# Patient Record
Sex: MALE | Race: BLACK OR AFRICAN AMERICAN | Employment: UNEMPLOYED | URBAN - METROPOLITAN AREA
[De-identification: names, ages, dates, MRNs, and addresses within clinical notes are randomized per-mention and may not be internally consistent; named-entity substitution may affect disease eponyms.]

---

## 2019-10-16 ENCOUNTER — HOSPITAL ENCOUNTER (OUTPATIENT)
Dept: PHYSICAL THERAPY | Age: 69
Discharge: HOME | End: 2019-10-16
Payer: MEDICARE

## 2019-10-16 PROCEDURE — 97161 PT EVAL LOW COMPLEX 20 MIN: CPT

## 2019-10-16 PROCEDURE — 97110 THERAPEUTIC EXERCISES: CPT

## 2019-10-16 NOTE — PROGRESS NOTES
[facility]  [address], [address] [facility]    OUTPATIENT PHYSICAL THERAPY    INITIAL EVALUATION      NAME: Steff Alcala AGE: 81 y.o. GENDER: male  DATE: 10/16/2019  REFERRING PHYSICIAN: Honorio Nowak MD    OBJECTIVE DATA SUMMARY:   Medical Diagnosis: left knee pain (M25.552) right knee pain (M25.561)  PT Diagnosis: pain affecting function, decreased ROM  Date of Onset: recent onset of stiffness and pain, B/L TKR surgery Left 6/2010, right 3/2011  Mechanism of Injury/Chief Complaint:  Gradual onset of stiffness and pain increased last 3 to 4 years  Present Symptoms: pain and stiffness in left knee, pain in right knee but it feels less stiff. Pt has been trying to walk more d/t a diagnosis of Type 2 diabetes, trying to lose weight. Functional Deficits and Limitations:   [x]     Sitting:   []    Dressing:   []    Reaching:  [x]     Standing:   []     Bathing:   []    Lifting:  [x]     Walking:   []     Cooking:   []    Yardwork:  [x]     Sleeping:   []     Cleaning:   []     Driving:  []     Work Tasks:  []     Recreation:  []    Other:    HISTORY:  Past Medical History:   Past Medical History:   Diagnosis Date    Arthritis     Diabetes ([address]     Heart murmur 3/18/2011    Hypertension     Other ill-defined conditions     HYPERLIPEDEMIA    PVC (premature ventricular contraction) 3/18/2011    Sinus bradycardia 3/18/2011     Past Surgical History:   Procedure Laterality Date    HX ORTHOPAEDIC  6/4/2010    RIGHT TKR    HX UROLOGICAL  1994    TESTICLE REMOVED       Precautions: none  Current Medications:  Tylenol  Prior Level of Function/Home Situation: Independent  Personal factors and/or comorbidities impacting plan of care:Pt diagnosed with diabetes in 2013,  Social/Work History:  Not working at this time  Previous Therapy:  Yes post surgery    SUBJECTIVE:   Pt reports pain and stiffness in both knees. TKR left 6/2010 right 3/2011.   Patients goals for therapy: decreased pain and increase exercise and walking distance    OBJECTIVE DATA SUMMARY:   EXAMINATION/PRESENTATION/DECISION MAKING:   Pain:  Location: top of patella B/L  Quality: aching  Now: 7/10  Best:  Worst:8/10  Factors that improve pain: rest    Posture:   Standing posture WNL    Strength:   B/L Knee flexion and knee extenstion 5/5    Range of Motion:   Left knee  Flexion 70 degrees  Extension -5 degrees    Right knee   Flexion 65 degrees  Extension -5 degrees    Spinal Assessment:   WNL    Joint Mobility:   Patella mobility  B/L Med- Lateral mild hypomobility  B/L Sup-inf, painful and hypomobile    Palpation:   TTP superior border of patella  b/L    Neurologic Assessment:   Tone: normal   Sensation: WNL   Reflexes: not tested    Special Tests:   None this date    Mobility:   Transitional Movements: WFL    Gait: WFL    Balance:  Normal    Functional Measure:   LEFS 48/80     Physical Therapy Evaluation Charge Determination   History Examination Presentation Decision-Making   LOW Complexity : Zero comorbidities / personal factors that will impact the outcome / POC LOW Complexity : 1-2 Standardized tests and measures addressing body structure, function, activity limitation and / or participation in recreation  LOW Complexity : Stable, uncomplicated  LOW Complexity : FOTO score of       Based on the above components, the patient evaluation is determined to be of the following complexity level: LOW     TREATMENT/INTERVENTION:  Modalities (Rationale): MHP b/L prior to exercises to decrease pain and increase flexibility      Therapeutic Exercises: to develop strength, endurance, range of motion, and flexibility  HEP Wall slide, hip flexor stretch, bridges, adductor stretch, knee extension stretch (support under heel), KTC, prone hip extension, prone knee flexion AROM, seated knee stretch, squats    Clinic activities    Supine  Hip flexor stretch, 3 reps x 1minute B  Bridges x 10 reps  Adductor stretch 3 reps 30 sec hold B  Knee extension stretch, with heel support 3 reps 30 sec hold B  KTC      Prone  Hip extension x 10 reps B  Knee flexion x 5 reps 20 sec hold B      Seated  Knee stretch with alt LE assist    Standing   Mini squats x 10 reps    Manual Therapy: for joint mobilization/manipulations and soft tissue mobilization  None mthis date    Neuro Re-Education: to improve movement, balance, coordination, kinesthetic sense, posture, and proprioception for sitting or standing balance  None this date    Therapeutic Activities: to improve functional performance, power, or agility  none    Ambulation/Gait Training:  None this date    Activity tolerance and post treatment pain report:   Good tolerance  Education:  [x]     Home exercise program provided. Education was provided to the patient on the following topics: importance of daily exercises to regain knee ROM. Patient verbalized understanding of the topics presented. ASSESSMENT:   Kirill Hernandez is a 81 y.o. male who presents with pain and decreased ROM in B/L knees. Pt reports TKR left 2010, right 2011. Good result but over last year or so, his pain has increased and he reports that his knees feel stiff. He has bee trying to increase his walking distance to lose weight. He was diagnosed with type 2 diabetes and has lost about 25#. Baldo Gonsalves Physical therapy problems based on objective data include: pain affecting function and decrease ROM . Patient will benefit from skilled intervention to address these impairments. Rehabilitation potential is considered to be Good. Factors which may influence rehabilitation potential include length of time since TKR surgery . Patient will benefit from physical therapy visits 2 times per week over 8 weeks to optimize improvement in these areas.     PLAN OF CARE:   Recommendations and Planned Interventions:  []     Therapeutic Activities  []     Heat/Ice  []     Therapeutic Exercises  []     Ultrasound  []     Gait training  [] E-stim  []     Balance training  []     Home exercise program  []     Manual Therapy  []     TENS  []     Neuro Re-Ed  []     Edema management  []     Posture/Biomechanics  []     Pain management  []     Traction  []     Other:    Frequency/Duration:  Patient will be followed by physical therapy 2 times a week for  8 weeks to address goals. GOALS  Short term goals  Time frame: 4 weeks  1. Patient will be compliant and independent with the initial HEP as evidenced by being able to perform without cuing. 2. Patient will report a 50% improvement in symptoms. 3. Patient report a 50% improvement in sleeping. 4. Patient will have a 20degree increase in B/L knee  ROM. Marsha Chess 5. Patient will tolerate 15 minutes of clinic activities before onset of symptoms. Long term goals  Time frame: 8 weeks  1. Patient will report pain level decrease to 3/10 to allow increased ease of movement. 2. Patient will have an improved score on the LEFS outcome measure by 20 points to demonstrate an increase in functional activity tolerance. 3. Patient will be independent in final individualized HEP. 4. Patient will have an increase in knee ROM to 100  degrees  5. Patient will return to walking without being limited by symptoms. 6. Patient will sleep 6-8 hours without being interrupted by pain. [x]     Patient has participated in goal setting and agrees to work toward plan of care. [x]     Patient was instructed to call if questions or concerns arise. Thank you for this referral.  Tj Noriega, PT   Time Calculation: 60 mins    Patient Time in clinic:   Start Time: 0800   Stop Time: 0900    TREATMENT PLAN EFFECTIVE DATES:   10/16/2019 TO 12/16/2019  I have read the above plan of care for Nimesh Crouch and certify the need for skilled physical therapy services.     Physician Signature: ____________________________________________________    Date: _________________________________________________________________

## 2019-10-21 ENCOUNTER — HOSPITAL ENCOUNTER (OUTPATIENT)
Dept: PHYSICAL THERAPY | Age: 69
Discharge: HOME | End: 2019-10-21
Payer: MEDICARE

## 2019-10-21 PROCEDURE — 97110 THERAPEUTIC EXERCISES: CPT | Performed by: PHYSICAL THERAPIST

## 2019-10-21 NOTE — PROGRESS NOTES
[facility]  [address], [address] [facility]    OUTPATIENT PHYSICAL THERAPY DAILY TREATMENT NOTE  VISIT: 2    NAME: Efe Rudd AGE: 81 y.o. GENDER: male  DATE: 10/21/2019  REFERRING PHYSICIAN: Vika Cowan MD        GOALS  Short term goals  Time frame: 4 weeks  1. Patient will be compliant and independent with the initial HEP as evidenced by being able to perform without cuing. 2. Patient will report a 50% improvement in symptoms. 3. Patient report a 50% improvement in sleeping. 4. Patient will have a 20degree increase in B/L knee  ROM. [place] Hind 5. Patient will tolerate 15 minutes of clinic activities before onset of symptoms.      Long term goals  Time frame: 8 weeks  1. Patient will report pain level decrease to 3/10 to allow increased ease of movement. 2. Patient will have an improved score on the LEFS outcome measure by 20 points to demonstrate an increase in functional activity tolerance. 3. Patient will be independent in final individualized HEP. 4. Patient will have an increase in knee ROM to 100  degrees  5. Patient will return to walking without being limited by symptoms. 6. Patient will sleep 6-8 hours without being interrupted by pain. SUBJECTIVE:   \"It feels a little looser. \"    Pain In: 6-7/10 bilateral knees    OBJECTIVE DATA SUMMARY:   Objective data from initial evaluation:   EXAMINATION/PRESENTATION/DECISION MAKING:   Pain:  Location: top of patella B/L  Quality: aching  Now: 7/10  Worst:8/10  Factors that improve pain: rest     Posture:   Standing posture WNL     Strength:   B/L Knee flexion and knee extenstion 5/5     Range of Motion:   Left knee  Flexion 70 degrees  Extension -5 degrees     Right knee   Flexion 65 degrees  Extension -5 degrees     Spinal Assessment:    WNL     Joint Mobility:   Patella mobility  B/L Med- Lateral mild hypomobility  B/L Sup-inf, painful and hypomobile     Palpation:   TTP superior border of patella b/L     Neurologic Assessment:               Tone: normal               Sensation: WNL               Reflexes: not tested     Special Tests:   None this date     Mobility:               Transitional Movements: WFL                Gait: WFL     Balance:  Normal     Functional Measure:   LEFS 48/80     TREATMENT/INTERVENTION:  Modalities (Rationale): MHP to bilateral knees for 10 minutes at end of session; to decrease pain and increase flexibility; no skin irritation noted        Therapeutic Exercises: to develop strength, endurance, range of motion, and flexibility  HEP Wall slide, hip flexor stretch, bridges, adductor stretch, knee extension stretch (support under heel), KTC, prone hip extension, prone knee flexion AROM, seated knee stretch, squats     Exercises in BOLD performed this date:      UBE/LBE: 5 minutes, level 1    Supine:   SLR: 2 sets of 5 reps B  Hip flexor stretch, 3 reps x 1minute B  Bridges x 10 reps  Adductor stretch 3 reps 30 sec hold B  Knee extension stretch, with heel support 3 reps 30 sec hold B  KTC     Prone  Hip extension: 2 sets of 5 reps B  Knee flexion stretch with sheet: 3 reps with 30 sec hold B     Seated  Knee stretch with alt LE assist: 2 reps with 30 sec holds B  Hamstring stretch: 2 reps with 30 sec holds B     Standing:   Mini squats: 2 sets of 10 reps  Knee flexion and extension stretch on step (with 3 risers each side): 5 reps each B     Manual Therapy: for joint mobilization/manipulations and soft tissue mobilization  None this date     Neuro Re-Education: to improve movement, balance, coordination, kinesthetic sense, posture, and proprioception for sitting or standing balance  None this date    Activity tolerance and post treatment pain report:   Good  Pain Out: 6/10    Education:  Education was provided to the patient on the following topics.   []    No changes were made to the home exercise program.  [x]    The following changes were made to the home exercise program: added seated hamstring stretch and prone knee flexion stretch with sheet for additional stretch  Patient verbalized understanding of the topics presented. ASSESSMENT:   Patient returns following initial evaluation. He presents with continued bilateral knee pain, but reports that his knees feel looser since starting the exercises. Patient reports regular performance of HEP. Patient tolerated clinic exercises well. Patient with significantly limited bilateral knee flexion ROM. Patients progression toward goals is as follows:  [x]     Improving appropriately and progressing toward goals  []     Improving slowly and progressing toward goals  []     Not making progress toward goals and plan of care will be adjusted    PLAN OF CARE:   Patient continues to benefit from skilled intervention to address the above impairments. [x]    Continue treatment per established plan of care.   []     Recommend the following changes to the plan of care    Recommendations/Intent for next treatment: Patellar mobs; side steps with TB    Haile Graf, PT   Time Calculation: 40 mins  Patient Time in clinic:   Start Time: 0800   Stop Time: 0840

## 2019-10-23 ENCOUNTER — HOSPITAL ENCOUNTER (OUTPATIENT)
Dept: PHYSICAL THERAPY | Age: 69
Discharge: HOME | End: 2019-10-23
Payer: MEDICARE

## 2019-10-23 PROCEDURE — 97110 THERAPEUTIC EXERCISES: CPT

## 2019-10-23 NOTE — PROGRESS NOTES
[address]    OUTPATIENT PHYSICAL THERAPY DAILY TREATMENT NOTE  VISIT: 3    NAME: Dimitri Seen AGE: 81 y.o. GENDER: male  DATE: 10/23/2019  REFERRING PHYSICIAN: Johny Hammans, MD        GOALS  Short term goals  Time frame: 4 weeks  1. Patient will be compliant and independent with the initial HEP as evidenced by being able to perform without cuing. 2. Patient will report a 50% improvement in symptoms. 3. Patient report a 50% improvement in sleeping. 4. Patient will have a 20degree increase in B/L knee  ROM. Sarina Pass 5. Patient will tolerate 15 minutes of clinic activities before onset of symptoms.      Long term goals  Time frame: 8 weeks  1. Patient will report pain level decrease to 3/10 to allow increased ease of movement. 2. Patient will have an improved score on the LEFS outcome measure by 20 points to demonstrate an increase in functional activity tolerance. 3. Patient will be independent in final individualized HEP. 4. Patient will have an increase in knee ROM to 100  degrees  5. Patient will return to walking without being limited by symptoms. 6. Patient will sleep 6-8 hours without being interrupted by pain. SUBJECTIVE:   \"It feels a little looser. \"    Pain In: 6-7/10 bilateral knees    OBJECTIVE DATA SUMMARY:   Objective data from initial evaluation:   EXAMINATION/PRESENTATION/DECISION MAKING:   Pain:  Location: top of patella B/L  Quality: aching  Now: 7/10  Worst:8/10  Factors that improve pain: rest     Posture:   Standing posture WNL     Strength:   B/L Knee flexion and knee extenstion 5/5     Range of Motion:   Left knee  Flexion 70 degrees  Extension -5 degrees     Right knee   Flexion 65 degrees  Extension -5 degrees     Spinal Assessment:    WNL     Joint Mobility:   Patella mobility  B/L Med- Lateral mild hypomobility  B/L Sup-inf, painful and hypomobile     Palpation:   TTP superior border of patella b/L     Neurologic Assessment:               Tone: normal               Sensation: WNL               Reflexes: not tested     Special Tests:   None this date     Mobility:               Transitional Movements: WFL                Gait: WFL     Balance:  Normal     Functional Measure:   LEFS 48/80     TREATMENT/INTERVENTION:  Modalities (Rationale): MHP to bilateral knees for 10 minutes at end of session; to decrease pain and increase flexibility; no skin irritation noted        Therapeutic Exercises: to develop strength, endurance, range of motion, and flexibility  HEP Wall slide, hip flexor stretch, bridges, adductor stretch, knee extension stretch (support under heel), KTC, prone hip extension, prone knee flexion AROM, seated knee stretch, squats     Exercises in BOLD performed this date:      UBE/LBE: 5 minutes, level 3    Supine:   SLR: 2 sets of 10 reps B  Bridges x 10 reps  Adductor stretch 3 reps 30 sec hold B  KTC 5 reps B, pt gripping behind knee     Prone  Hip extension: 2 sets of 5 reps B  Knee flexion stretch with sheet: 3 reps with 30 sec hold B     Seated  LAQ B x 10 reps  Hamstring stretch: 2 reps with 30 sec holds B     Standing:   Mini squats: 2 sets of 10 reps  Knee flexion and extension stretch on step (with 3 risers each side): 5 reps each B  Hip ext and abduction B red TB 10 reps     Manual Therapy: for joint mobilization/manipulations and soft tissue mobilization  None this date     Neuro Re-Education: to improve movement, balance, coordination, kinesthetic sense, posture, and proprioception for sitting or standing balance  None this date    Activity tolerance and post treatment pain report:   Good  Pain Out: 6/10    Education:  Education was provided to the patient on the following topics.   []    No changes were made to the home exercise program.  [x]    The following changes were made to the home exercise program: added seated hamstring stretch and prone knee flexion stretch with sheet for additional stretch  Patient verbalized understanding of the topics presented. ASSESSMENT:    He presents with continued bilateral knee pain and left knee swelling, but reports that knees feel looser since evaluation. . Patient reports regular performance of HEP, good recall of exercises and effort during Therapy session. .dvance as able, patient with significantly limited bilateral knee flexion ROM. Patients progression toward goals is as follows:  [x]     Improving appropriately and progressing toward goals  []     Improving slowly and progressing toward goals  []     Not making progress toward goals and plan of care will be adjusted    PLAN OF CARE:   Patient continues to benefit from skilled intervention to address the above impairments. [x]    Continue treatment per established plan of care.   []     Recommend the following changes to the plan of care    Recommendations/Intent for next treatment: Patellar mobs; side steps with TB    Mariajose Hernandez, PT   Time Calculation: 40 mins  Patient Time in clinic:   Start Time: 0800   Stop Time: 7733

## 2019-10-28 ENCOUNTER — HOSPITAL ENCOUNTER (OUTPATIENT)
Dept: PHYSICAL THERAPY | Age: 69
Discharge: HOME | End: 2019-10-28
Payer: MEDICARE

## 2019-10-28 PROCEDURE — 97110 THERAPEUTIC EXERCISES: CPT | Performed by: PHYSICAL THERAPIST

## 2019-10-28 NOTE — PROGRESS NOTES
[facility]  [address], [address] [facility]    OUTPATIENT PHYSICAL THERAPY DAILY TREATMENT NOTE  VISIT: 4    NAME: Bassam Noble AGE: 81 y.o. GENDER: male  DATE: 10/28/2019  REFERRING PHYSICIAN: Colton Dowling MD      GOALS  Short term goals  Time frame: 4 weeks  1. Patient will be compliant and independent with the initial HEP as evidenced by being able to perform without cuing. 2. Patient will report a 50% improvement in symptoms. 3. Patient report a 50% improvement in sleeping. 4. Patient will have a 20degree increase in B/L knee  ROM. Daphne Geiger 5. Patient will tolerate 15 minutes of clinic activities before onset of symptoms.      Long term goals  Time frame: 8 weeks  1. Patient will report pain level decrease to 3/10 to allow increased ease of movement. 2. Patient will have an improved score on the LEFS outcome measure by 20 points to demonstrate an increase in functional activity tolerance. 3. Patient will be independent in final individualized HEP. 4. Patient will have an increase in knee ROM to 100  degrees  5. Patient will return to walking without being limited by symptoms. 6. Patient will sleep 6-8 hours without being interrupted by pain. SUBJECTIVE:   \"The right knee feels weak, but the left knee feels stiff\"    Pain In: 5-6/10 bilateral knees    OBJECTIVE DATA SUMMARY:   Objective data from initial evaluation:   EXAMINATION/PRESENTATION/DECISION MAKING:   Pain:  Location: top of patella B/L  Quality: aching  Now: 7/10  Worst:8/10  Factors that improve pain: rest     Posture:   Standing posture WNL     Strength:   B/L Knee flexion and knee extenstion 5/5     Range of Motion:   Left knee  Flexion 70 degrees  Extension -5 degrees     Right knee   Flexion 65 degrees  Extension -5 degrees     Spinal Assessment:    WNL     Joint Mobility:   Patella mobility  B/L Med- Lateral mild hypomobility  B/L Sup-inf, painful and hypomobile     Palpation:   TTP superior border of patella  b/L     Neurologic Assessment:               Tone: normal               Sensation: WNL               Reflexes: not tested     Special Tests:   None this date     Mobility:               Transitional Movements: WFL                Gait: WFL     Balance:  Normal     Functional Measure:   LEFS 48/80     TREATMENT/INTERVENTION:  Modalities (Rationale): MHP to bilateral knees for 10 minutes at end of session; to decrease pain and increase flexibility; no skin irritation noted        Therapeutic Exercises: to develop strength, endurance, range of motion, and flexibility  HEP Wall slide, hip flexor stretch, bridges, adductor stretch, knee extension stretch (support under heel), KTC, prone hip extension, prone knee flexion AROM, seated knee stretch, squats     Exercises in BOLD performed this date:      UBE/LBE: 8 minutes, level 3.5    Supine:   SLR with 2#: 2 sets of 10 reps B  Bridges x 10 reps  Adductor stretch 3 reps 30 sec hold B  KTC 5 reps B, pt gripping behind knee     Prone  Hip extension: 10 reps B  Knee flexion stretch with sheet: 3 reps with 30 sec hold B     Seated  LAQ B x 10 reps  Hamstring stretch: 2 reps with 30 sec holds B     Standing:   Mini squats with blue TB: 2 sets of 10 reps  Knee flexion and extension stretch on step (with 3 risers each side): 5 reps each B  Hip extension with green TB: 10 reps B  Hip abduction with green TB: 10 reps B  Step ups (2 risers each side): 10 reps B  Side steps with blue TB: 15 feet x 2 each direction     Manual Therapy: for joint mobilization/manipulations and soft tissue mobilization  None this date     Neuro Re-Education: to improve movement, balance, coordination, kinesthetic sense, posture, and proprioception for sitting or standing balance  None this date    Activity tolerance and post treatment pain report:   Good  Pain Out: 5/10    Education:  Education was provided to the patient on the following topics. continue HEP  [x]    No changes were made to the home exercise program.  []    The following changes were made to the home exercise program  Patient verbalized understanding of the topics presented. ASSESSMENT:   He presents with continued stiffness and pain in bilateral knees. He reports that the right knee feels weaker than the left. Patient reports regular performance of HEP. Patient with significantly limited bilateral knee flexion ROM, but reports that his knees do feel looser than on evaluation. Patient tolerated additional exercises well with good participation noted. Patients progression toward goals is as follows:  [x]     Improving appropriately and progressing toward goals  []     Improving slowly and progressing toward goals  []     Not making progress toward goals and plan of care will be adjusted    PLAN OF CARE:   Patient continues to benefit from skilled intervention to address the above impairments. [x]    Continue treatment per established plan of care.   []     Recommend the following changes to the plan of care    Recommendations/Intent for next treatment: Patellar dylon Bell PT   Time Calculation: 40 mins  Patient Time in clinic:   Start Time: 0930   Stop Time: 1010

## 2019-11-01 ENCOUNTER — HOSPITAL ENCOUNTER (OUTPATIENT)
Dept: PHYSICAL THERAPY | Age: 69
Discharge: HOME | End: 2019-11-01
Payer: MEDICARE

## 2019-11-01 PROCEDURE — 97110 THERAPEUTIC EXERCISES: CPT | Performed by: PHYSICAL THERAPIST

## 2019-11-01 PROCEDURE — 97140 MANUAL THERAPY 1/> REGIONS: CPT | Performed by: PHYSICAL THERAPIST

## 2019-11-01 NOTE — PROGRESS NOTES
[facility]  [address], [address] [facility]    OUTPATIENT PHYSICAL THERAPY DAILY TREATMENT NOTE  VISIT: 5    NAME: Rigoberto Traylor AGE: 81 y.o. GENDER: male  DATE: 11/1/2019  REFERRING PHYSICIAN: Osbaldo Padilla MD      GOALS  Short term goals  Time frame: 4 weeks  1. Patient will be compliant and independent with the initial HEP as evidenced by being able to perform without cuing. 2. Patient will report a 50% improvement in symptoms. 3. Patient report a 50% improvement in sleeping. 4. Patient will have a 20degree increase in B/L knee  ROM. Dagmar Mays 5. Patient will tolerate 15 minutes of clinic activities before onset of symptoms.      Long term goals  Time frame: 8 weeks  1. Patient will report pain level decrease to 3/10 to allow increased ease of movement. 2. Patient will have an improved score on the LEFS outcome measure by 20 points to demonstrate an increase in functional activity tolerance. 3. Patient will be independent in final individualized HEP. 4. Patient will have an increase in knee ROM to 100  degrees  5. Patient will return to walking without being limited by symptoms. 6. Patient will sleep 6-8 hours without being interrupted by pain. SUBJECTIVE:   \"My knees feel looser. The right one will occasionally get a sharp pain on the inside when I turn quickly or get up wrong. \"    Pain In: 3-4/10 bilateral knees    OBJECTIVE DATA SUMMARY:   Objective data from initial evaluation:   EXAMINATION/PRESENTATION/DECISION MAKING:   Pain:  Location: top of patella B/L  Quality: aching  Now: 7/10  Worst:8/10  Factors that improve pain: rest     Posture:   Standing posture WNL     Strength:   B/L Knee flexion and knee extension 5/5     Range of Motion:   Left knee AAROM in supine  Flexion 70 degrees (Updated 11/1/19: 88 degrees)  Extension -5 degrees (Updated 11/1/19: 0 degrees)     Right knee AAROM in supine:   Flexion 65 degrees (Updated 11/1/19: 92 degrees)  Extension -5 degrees (Updated 11/1/19: 0 degrees)     Spinal Assessment:    WNL     Joint Mobility:   Patella mobility  B/L Med- Lateral mild hypomobility  B/L Sup-inf, painful and hypomobile     Palpation:   TTP superior border of patella  b/L     Neurologic Assessment:               Tone: normal               Sensation: WNL               Reflexes: not tested     Special Tests:   None this date     Mobility:               Transitional Movements: WFL                Gait: WFL     Balance:  Normal     Functional Measure:   LEFS 48/80     TREATMENT/INTERVENTION:  Modalities (Rationale): MHP to bilateral knees for 10 minutes at end of session; to decrease pain and increase flexibility; no skin irritation noted held this date        Therapeutic Exercises: to develop strength, endurance, range of motion, and flexibility  HEP Wall slide, hip flexor stretch, bridges, adductor stretch, knee extension stretch (support under heel), KTC, prone hip extension, prone knee flexion AROM, seated knee stretch, squats     Exercises in BOLD performed this date:      UBE/LBE: 5 minutes, level 4    Supine:   SLR with 2#: 2 sets of 10 reps B  Bridges x 10 reps  Adductor stretch 3 reps 30 sec hold B  KTC 5 reps B, pt gripping behind knee     Prone  Hip extension: 10 reps B  Knee flexion stretch with sheet: 3 reps with 30 sec hold B     Seated:  LAQ B x 10 reps  Hamstring stretch: 2 reps with 30 sec holds B     Standing:   Mini squats with blue TB: 2 sets of 10 reps  Knee flexion and extension stretch on step (with 3 risers each side): 5 reps each B  Hip extension with green TB: 10 reps B  Hip abduction with green TB: 10 reps B  Step ups (2 risers each side): 10 reps B  Side steps with blue TB: 15 feet x 2 each direction     Manual Therapy: for joint mobilization/manipulations and soft tissue mobilization  B Knee flexion stretch in prone   Patellar mobs medial-lateral, superior-inferior; grade 2, 3  STM to distal hamstrings B     Neuro Re-Education: to improve movement, balance, coordination, kinesthetic sense, posture, and proprioception for sitting or standing balance  None this date    Activity tolerance and post treatment pain report:   Good  Pain Out: 3/10    Education:  Education was provided to the patient on the following topics. continue HEP  [x]    No changes were made to the home exercise program.  []    The following changes were made to the home exercise program  Patient verbalized understanding of the topics presented. ASSESSMENT:   Patient presents with overall decrease in bilateral knee pain since evaluation. Significant improvement in bilateral knee ROM noted. Patient continues to experience stiffness in bilateral knees, but reports that his knees feel looser. Patient occasionally experiences a sharp pain at his medial right knee if he turns quickly or gets up with his knee twisted the wrong way. Will continue to monitor. He reports that he does typically favor his right knee when walking. Patient reports regular performance of HEP. Patients progression toward goals is as follows:  [x]     Improving appropriately and progressing toward goals  []     Improving slowly and progressing toward goals  []     Not making progress toward goals and plan of care will be adjusted    PLAN OF CARE:   Patient continues to benefit from skilled intervention to address the above impairments. [x]    Continue treatment per established plan of care.   []     Recommend the following changes to the plan of care    Recommendations/Intent for next treatment: progress as able    Brittnee Amezquita, PT   Time Calculation: 50 mins  Patient Time in clinic:   Start Time: 0830   Stop Time: 1791

## 2019-11-04 ENCOUNTER — HOSPITAL ENCOUNTER (OUTPATIENT)
Dept: PHYSICAL THERAPY | Age: 69
Discharge: HOME | End: 2019-11-04
Payer: MEDICARE

## 2019-11-04 PROCEDURE — 97110 THERAPEUTIC EXERCISES: CPT | Performed by: PHYSICAL THERAPIST

## 2019-11-04 NOTE — PROGRESS NOTES
[facility]  [address], [address] [facility]    OUTPATIENT PHYSICAL THERAPY DAILY TREATMENT NOTE  VISIT: 6    NAME: Laurence Petit AGE: 81 y.o. GENDER: male  DATE: 11/4/2019  REFERRING PHYSICIAN: Marbin Haskins MD      GOALS  Short term goals  Time frame: 4 weeks  1. Patient will be compliant and independent with the initial HEP as evidenced by being able to perform without cuing. 2. Patient will report a 50% improvement in symptoms. 3. Patient report a 50% improvement in sleeping. 4. Patient will have a 20degree increase in B/L knee  ROM. Amari Rein 5. Patient will tolerate 15 minutes of clinic activities before onset of symptoms.      Long term goals  Time frame: 8 weeks  1. Patient will report pain level decrease to 3/10 to allow increased ease of movement. 2. Patient will have an improved score on the LEFS outcome measure by 20 points to demonstrate an increase in functional activity tolerance. 3. Patient will be independent in final individualized HEP. 4. Patient will have an increase in knee ROM to 100  degrees  5. Patient will return to walking without being limited by symptoms. 6. Patient will sleep 6-8 hours without being interrupted by pain.        SUBJECTIVE:   \"My right one still occasionally feels like it'll give out at times\"    Pain In: 3-4/10 bilateral knees    OBJECTIVE DATA SUMMARY:   Objective data from initial evaluation:   EXAMINATION/PRESENTATION/DECISION MAKING:   Pain:  Location: top of patella B/L  Quality: aching  Now: 7/10  Worst:8/10  Factors that improve pain: rest     Posture:   Standing posture WNL     Strength:   B/L Knee flexion and knee extension 5/5     Range of Motion:   Left knee AAROM in supine  Flexion 70 degrees (Updated 11/1/19: 88 degrees)  Extension -5 degrees (Updated 11/1/19: 0 degrees)     Right knee AAROM in supine:   Flexion 65 degrees (Updated 11/1/19: 92 degrees)  Extension -5 degrees (Updated 11/1/19: 0 degrees)     Spinal Assessment:    WNL     Joint Mobility:   Patella mobility  B/L Med- Lateral mild hypomobility  B/L Sup-inf, painful and hypomobile     Palpation:   TTP superior border of patella  b/L     Neurologic Assessment:               Tone: normal               Sensation: WNL               Reflexes: not tested     Special Tests:   None this date     Mobility:               Transitional Movements: WFL                Gait: WFL     Balance:  Normal     Functional Measure:   LEFS 48/80     TREATMENT/INTERVENTION:  Modalities (Rationale): MHP to bilateral knees for 10 minutes at end of session; to decrease pain and increase flexibility; no skin irritation noted held this date     Therapeutic Exercises: to develop strength, endurance, range of motion, and flexibility  HEP Wall slide, hip flexor stretch, bridges, adductor stretch, knee extension stretch (support under heel), KTC, prone hip extension, prone knee flexion AROM, seated knee stretch, squats     Exercises in BOLD performed this date:      UBE/LBE: 5 minutes, level 4    Supine:   SLR with 2#: 2 sets of 10 reps B  Bridges x 10 reps  Adductor stretch 3 reps 30 sec hold B  KTC 5 reps B, pt gripping behind knee     Prone  Hip extension: 10 reps B  Knee flexion stretch with sheet: 3 reps with 30 sec hold B     Seated:  LAQ B x 10 reps  Hamstring stretch: 2 reps with 30 sec holds B     Standing:   Mini squats with blue TB: 2 sets of 10 reps  Knee flexion and extension stretch on step (with 3 risers each side): 5 reps each B  Hip extension with blue TB: 2 sets of 10 reps B  Hip abduction with blue TB: 2 sets of 10 reps B  TKE with blue TB: 10 reps B  Step ups (2 risers each side): 10 reps B  Side steps with blue TB: 15 feet x 2 each direction     Manual Therapy: for joint mobilization/manipulations and soft tissue mobilization  B Knee flexion stretch in prone   Patellar mobs medial-lateral, superior-inferior; grade 2, 3  STM to distal hamstrings B     Neuro Re-Education: to improve movement, balance, coordination, kinesthetic sense, posture, and proprioception for sitting or standing balance  None this date    Activity tolerance and post treatment pain report:   Good  Pain Out: 3/10    Education:  Education was provided to the patient on the following topics. continue HEP  [x]    No changes were made to the home exercise program.  []    The following changes were made to the home exercise program  Patient verbalized understanding of the topics presented. ASSESSMENT:   Patient presents with overall decrease in bilateral knee pain. Right knee occasionally feels like it'll buckle on him. Significant improvement in bilateral knee ROM noted. Patient continues to experience stiffness in bilateral knees, but reports that his knees feel looser. Patient reports regular performance of HEP. Good participation and tolerance of clinic exercises. Patients progression toward goals is as follows:  [x]     Improving appropriately and progressing toward goals  []     Improving slowly and progressing toward goals  []     Not making progress toward goals and plan of care will be adjusted    PLAN OF CARE:   Patient continues to benefit from skilled intervention to address the above impairments. [x]    Continue treatment per established plan of care.   []     Recommend the following changes to the plan of care    Recommendations/Intent for next treatment: provide patient with new HEP for gym     Duke Gambino PT   Time Calculation: 54 mins  Patient Time in clinic:   Start Time: 0801   Stop Time: 3235

## 2019-11-08 ENCOUNTER — HOSPITAL ENCOUNTER (OUTPATIENT)
Dept: PHYSICAL THERAPY | Age: 69
Discharge: HOME | End: 2019-11-08
Payer: MEDICARE

## 2019-11-08 PROCEDURE — 97110 THERAPEUTIC EXERCISES: CPT | Performed by: PHYSICAL THERAPIST

## 2019-11-08 NOTE — PROGRESS NOTES
[facility]  [address], [address] [facility]    OUTPATIENT PHYSICAL THERAPY DAILY TREATMENT NOTE  VISIT: 7    NAME: Rubén Trent AGE: 81 y.o. GENDER: male  DATE: 11/8/2019  REFERRING PHYSICIAN: Chet Montes MD      GOALS  Short term goals  Time frame: 4 weeks  1. Patient will be compliant and independent with the initial HEP as evidenced by being able to perform without cuing. 2. Patient will report a 50% improvement in symptoms. 3. Patient report a 50% improvement in sleeping. 4. Patient will have a 20degree increase in B/L knee  ROM. Darlene Pires 5. Patient will tolerate 15 minutes of clinic activities before onset of symptoms.      Long term goals  Time frame: 8 weeks  1. Patient will report pain level decrease to 3/10 to allow increased ease of movement. 2. Patient will have an improved score on the LEFS outcome measure by 20 points to demonstrate an increase in functional activity tolerance. 3. Patient will be independent in final individualized HEP. 4. Patient will have an increase in knee ROM to 100  degrees  5. Patient will return to walking without being limited by symptoms. 6. Patient will sleep 6-8 hours without being interrupted by pain.        SUBJECTIVE:   \"My right one still feels like it'll give out at times\"    Pain In: 3-4/10 right knee; 2-3/10 left knee    OBJECTIVE DATA SUMMARY:   Objective data from initial evaluation:   EXAMINATION/PRESENTATION/DECISION MAKING:   Pain:  Location: top of patella B/L  Quality: aching  Now: 7/10  Worst:8/10  Factors that improve pain: rest     Posture:   Standing posture WNL     Strength:   B/L Knee flexion and knee extension 5/5     Range of Motion:   Left knee AAROM in supine  Flexion 70 degrees (Updated 11/1/19: 88 degrees)  Extension -5 degrees (Updated 11/1/19: 0 degrees)     Right knee AAROM in supine:   Flexion 65 degrees (Updated 11/1/19: 92 degrees)  Extension -5 degrees (Updated 11/1/19: 0 degrees)     Spinal Assessment:    WNL     Joint Mobility:   Patella mobility  B/L Med- Lateral mild hypomobility  B/L Sup-inf, painful and hypomobile     Palpation:   TTP superior border of patella  b/L     Neurologic Assessment:               Tone: normal               Sensation: WNL               Reflexes: not tested     Special Tests:   None this date     Mobility:               Transitional Movements: WFL                Gait: WFL     Balance:  Normal     Functional Measure:   LEFS 48/80     TREATMENT/INTERVENTION:  Modalities (Rationale): MHP to bilateral knees for 8 minutes at end of session; to decrease pain and increase flexibility; no skin irritation noted held this date     Therapeutic Exercises: to develop strength, endurance, range of motion, and flexibility  HEP Wall slide, hip flexor stretch, bridges, adductor stretch, knee extension stretch (support under heel), KTC, prone hip extension, prone knee flexion AROM, seated knee stretch, squats  Added to HEP 11/8/19: leg press machine (bilateral and single leg); leg extension machine (bialteral and single leg); leg curls machine (bilateral and single leg); back squat; step up (knee and hip 90/90); side steps with TB (blue TB dispensed); monsters walks with TB (forward and backward steps)     Exercises in BOLD performed this date:      UBE/LBE: 5 minutes, level 4    Supine:   SLR with 2#: 2 sets of 10 reps B  Bridges x 10 reps  Adductor stretch 3 reps 30 sec hold B  KTC 5 reps B, pt gripping behind knee     Prone  Hip extension: 10 reps B  Knee flexion stretch with sheet: 3 reps with 30 sec hold B     Seated:  LAQ B x 10 reps  Hamstring stretch: 2 reps with 30 sec holds B     Standing:   Mini squats with blue TB: 2 sets of 10 reps  Back squats with 5# bar: 10 reps  Knee flexion and extension stretch on step (with 3 risers each side): 5 reps each B  Hip extension with blue TB: 2 sets of 10 reps B  Hip abduction with blue TB: 2 sets of 10 reps B  TKE with blue TB: 10 reps B  Step ups (3 risers each side) with hip and knees at 90/90 while holding 5# dumbbell: 10 reps B  Side steps with blue TB: 15 feet x 4 each direction   Monsters walks with blue TB: 10 feet x 4 each direction; increased cues for form  Lunges: 8 reps B    Manual Therapy: for joint mobilization/manipulations and soft tissue mobilization  B Knee flexion stretch in prone   Patellar mobs medial-lateral, superior-inferior; grade 2, 3  STM to distal hamstrings B     Neuro Re-Education: to improve movement, balance, coordination, kinesthetic sense, posture, and proprioception for sitting or standing balance  Kinesiotape applied to right knee in two horizontal C strips at either side of patella for additional support. Patient educated on purpose and proper removal of taping with good understanding verbalized. Pain relief with taping. Activity tolerance and post treatment pain report:   Good  Pain Out: 3/10    Education:  Education was provided to the patient on the following topics. continue HEP  [x]    No changes were made to the home exercise program.  [x]    The following changes were made to the home exercise program: added to HEP (see above); educated to start with light weights at gym to ensure proper form  Patient verbalized understanding of the topics presented. ASSESSMENT:   Patient presents with overall decrease in bilateral knee pain. Right knee is more stiff and painful than left. Right knee occasionally feels unstable like it will buckle. Kinesiotape applied to right knee for additional support; pain relief noted post application. Good improvement in bilateral knee ROM since evaluation. Patient continues to be challenged by exercises, but tolerated additional clinic exercises well this date. Patient reports regular performance of HEP. Added to HEP this date as patient plans to return to gym to continue endurance and strength training.  Good participation noted    Patients progression toward goals is as follows:  [x]     Improving appropriately and progressing toward goals  []     Improving slowly and progressing toward goals  []     Not making progress toward goals and plan of care will be adjusted    PLAN OF CARE:   Patient continues to benefit from skilled intervention to address the above impairments. [x]    Continue treatment per established plan of care.   []     Recommend the following changes to the plan of care    Recommendations/Intent for next treatment: check in about kinesiotape and new HEP    Erick Rucker, PT   Time Calculation: 55 mins  Patient Time in clinic:   Start Time: 0800   Stop Time: 5219

## 2019-11-11 ENCOUNTER — HOSPITAL ENCOUNTER (OUTPATIENT)
Dept: PHYSICAL THERAPY | Age: 69
Discharge: HOME | End: 2019-11-11
Payer: MEDICARE

## 2019-11-11 PROCEDURE — 97110 THERAPEUTIC EXERCISES: CPT

## 2019-11-11 NOTE — PROGRESS NOTES
[facility]  [address], [address] [facility]    OUTPATIENT PHYSICAL THERAPY DAILY TREATMENT NOTE  VISIT: 8    NAME: Goran Martínez AGE: 81 y.o. GENDER: male  DATE: 11/11/2019  REFERRING PHYSICIAN: John Nowak MD      GOALS  Short term goals  Time frame: 4 weeks  1. Patient will be compliant and independent with the initial HEP as evidenced by being able to perform without cuing. 2. Patient will report a 50% improvement in symptoms. 3. Patient report a 50% improvement in sleeping. 4. Patient will have a 20degree increase in B/L knee  ROM. Rachid Price 5. Patient will tolerate 15 minutes of clinic activities before onset of symptoms.      Long term goals  Time frame: 8 weeks  1. Patient will report pain level decrease to 3/10 to allow increased ease of movement. 2. Patient will have an improved score on the LEFS outcome measure by 20 points to demonstrate an increase in functional activity tolerance. 3. Patient will be independent in final individualized HEP. 4. Patient will have an increase in knee ROM to 100  degrees  5. Patient will return to walking without being limited by symptoms. 6. Patient will sleep 6-8 hours without being interrupted by pain.        SUBJECTIVE:   \"My right one still feels like it'll give out at times\"  To see Dr Zamorano Mater December 3    Pain In: 3-4/10 right knee; 2-3/10 left knee    OBJECTIVE DATA SUMMARY:   Objective data from initial evaluation:   EXAMINATION/PRESENTATION/DECISION MAKING:   Pain:  Location: top of patella B/L  Quality: aching  Now: 7/10  Worst:8/10  Factors that improve pain: rest     Posture:   Standing posture WNL     Strength:   B/L Knee flexion and knee extension 5/5     Range of Motion:   Left knee AAROM in supine  Flexion 70 degrees (Updated 11/1/19: 88 degrees)  Extension -5 degrees (Updated 11/1/19: 0 degrees)     Right knee AAROM in supine:   Flexion 65 degrees (Updated 11/1/19: 92 degrees)  Extension -5 degrees (Updated 11/1/19: 0 degrees)     Spinal Assessment: WNL     Joint Mobility:   Patella mobility  B/L Med- Lateral mild hypomobility  B/L Sup-inf, painful and hypomobile     Palpation:   TTP superior border of patella  b/L     Neurologic Assessment:               Tone: normal               Sensation: WNL               Reflexes: not tested     Special Tests:   None this date     Mobility:               Transitional Movements: WFL                Gait: WFL     Balance:  Normal     Functional Measure:   LEFS 48/80     TREATMENT/INTERVENTION:  Modalities (Rationale): MHP to bilateral knees for 8 minutes at end of session; to decrease pain and increase flexibility; no skin irritation noted held this date     Therapeutic Exercises: to develop strength, endurance, range of motion, and flexibility  HEP Wall slide, hip flexor stretch, bridges, adductor stretch, knee extension stretch (support under heel), KTC, prone hip extension, prone knee flexion AROM, seated knee stretch, squats  Added to HEP 11/8/19: leg press machine (bilateral and single leg); leg extension machine (bialteral and single leg); leg curls machine (bilateral and single leg); back squat; step up (knee and hip 90/90); side steps with TB (blue TB dispensed); monsters walks with TB (forward and backward steps)     Exercises in BOLD performed this date:      UBE/LBE: 5 minutes, level 4    TM x 3.5 minutes, 15 sec intervals of lunge walking 30 sec normal steps.     Supine:   SLR with 2#: 2 sets of 10 reps B  Bridges x 10 reps 2 sets  Adductor stretch 3 reps 30 sec hold B  KTC 5 reps B, use towel to improve knee bend  BKTC x 10 reps     Prone  Hip extension: 10 reps B  Knee flexion stretch with sheet: 3 reps with 30 sec hold B  Knee flexion 5# 5 reps B     Seated:  LAQ B x 10 reps  Hamstring stretch: 2 reps with 30 sec holds B     Standing:   Mini squats with blue TB: 2 sets of 10 reps  Back squats with 5# bar: 10 reps  Knee flexion and extension stretch on step (with 3 risers each side): 5 reps each B  Hip extension with 4# : 2 sets of 10 reps B  Hip abduction with blue TB: 2 sets of 10 reps B  TKE with blue TB: 10 reps B  Step ups and side step ups (3 risers each side) with hip and knees at 90/90 while holding 5# dumbbell in each hand: 5 reps B  Side steps with blue TB: 15 feet x 4 each direction   Monsters walks with blue TB: 10 feet x 4 each direction; increased cues for form  Lunges: 8 reps B    Manual Therapy: for joint mobilization/manipulations and soft tissue mobilization  B Knee flexion stretch in prone   Patellar mobs medial-lateral, superior-inferior; grade 2, 3  STM to distal hamstrings B     Neuro Re-Education: to improve movement, balance, coordination, kinesthetic sense, posture, and proprioception for sitting or standing balance  Kinesiotape applied to right knee in two horizontal C strips at either side of patella for additional support. Patient educated on purpose and proper removal of taping with good understanding verbalized. Pain relief with taping. Activity tolerance and post treatment pain report:   Good  Pain Out: 3/10    Education:  Education was provided to the patient on the following topics. continue HEP  [x]    No changes were made to the home exercise program.  [x]    The following changes were made to the home exercise program: added to HEP (see above); educated to start with light weights at gym to ensure proper form  Patient verbalized understanding of the topics presented. ASSESSMENT:   Patient presents with decreased knee pain since evaluation. . Right knee is more stiff and painful than left. Right knee occasionally feels unstable like it will buckle. Pt reported good response to right knee kenisio tape. . Patient continues to be challenged by exercises, but tolerated additional clinic exercises well this date. Patient reports regular performance of HEP and is going to return to the gym to advance exercises.   Pt to see Dr Evelina Tyler on Dec 3.. Good participation noted    Patients progression toward goals is as follows:  [x]     Improving appropriately and progressing toward goals  []     Improving slowly and progressing toward goals  []     Not making progress toward goals and plan of care will be adjusted    PLAN OF CARE:   Patient continues to benefit from skilled intervention to address the above impairments. [x]    Continue treatment per established plan of care.   []     Recommend the following changes to the plan of care    Recommendations/Intent for next treatment:     Rocael Hamlin, PT   Time Calculation: 50 mins  Patient Time in clinic:   Start Time: 0830   Stop Time: 9768

## 2019-11-15 ENCOUNTER — HOSPITAL ENCOUNTER (OUTPATIENT)
Dept: PHYSICAL THERAPY | Age: 69
Discharge: HOME | End: 2019-11-15
Payer: MEDICARE

## 2019-11-15 PROCEDURE — 97110 THERAPEUTIC EXERCISES: CPT | Performed by: PHYSICAL THERAPIST

## 2019-11-15 NOTE — PROGRESS NOTES
[facility]  [address], [address] [facility]    OUTPATIENT PHYSICAL THERAPY DAILY TREATMENT NOTE  VISIT: 9    NAME: Chava Griffin AGE: 81 y.o. GENDER: male  DATE: 11/15/2019  REFERRING PHYSICIAN: Bradni Wood MD      GOALS  Short term goals  Time frame: 4 weeks  1. Patient will be compliant and independent with the initial HEP as evidenced by being able to perform without cuing. 2. Patient will report a 50% improvement in symptoms. 3. Patient report a 50% improvement in sleeping. 4. Patient will have a 20degree increase in B/L knee  ROM. Ivone Cardoza 5. Patient will tolerate 15 minutes of clinic activities before onset of symptoms.      Long term goals  Time frame: 8 weeks  1. Patient will report pain level decrease to 3/10 to allow increased ease of movement. 2. Patient will have an improved score on the LEFS outcome measure by 20 points to demonstrate an increase in functional activity tolerance. 3. Patient will be independent in final individualized HEP. 4. Patient will have an increase in knee ROM to 100  degrees  5. Patient will return to walking without being limited by symptoms. 6. Patient will sleep 6-8 hours without being interrupted by pain.        SUBJECTIVE:   \"My right knee feels better with the tape\"    Pain In: 3/10 right knee; 1/10 left knee    OBJECTIVE DATA SUMMARY:   Objective data from initial evaluation:   EXAMINATION/PRESENTATION/DECISION MAKING:   Pain:  Location: top of patella B/L  Quality: aching  Now: 7/10  Worst:8/10  Factors that improve pain: rest     Posture:   Standing posture WNL     Strength:   B/L Knee flexion and knee extension 5/5     Range of Motion:   Left knee AAROM in supine  Flexion 70 degrees (Updated 11/1/19: 88 degrees)  Extension -5 degrees (Updated 11/1/19: 0 degrees)     Right knee AAROM in supine:   Flexion 65 degrees (Updated 11/1/19: 92 degrees)  Extension -5 degrees (Updated 11/1/19: 0 degrees)     Spinal Assessment: WNL     Joint Mobility:   Patella mobility  B/L Med- Lateral mild hypomobility  B/L Sup-inf, painful and hypomobile     Palpation:   TTP superior border of patella  b/L     Neurologic Assessment:               Tone: normal               Sensation: WNL               Reflexes: not tested     Special Tests:   None this date     Mobility:               Transitional Movements: WFL                Gait: WFL     Balance:  Normal     Functional Measure:   LEFS 48/80     TREATMENT/INTERVENTION:  Modalities (Rationale): MHP to bilateral knees for 8 minutes at end of session; to decrease pain and increase flexibility; no skin irritation noted held this date     Therapeutic Exercises: to develop strength, endurance, range of motion, and flexibility  HEP Wall slide, hip flexor stretch, bridges, adductor stretch, knee extension stretch (support under heel), KTC, prone hip extension, prone knee flexion AROM, seated knee stretch, squats  Added to HEP 11/8/19: leg press machine (bilateral and single leg); leg extension machine (bialteral and single leg); leg curls machine (bilateral and single leg); back squat; step up (knee and hip 90/90); side steps with TB (blue TB dispensed); monsters walks with TB (forward and backward steps)     Exercises in BOLD performed this date:      UBE/LBE: 5 minutes, level 4    TM x 3.5 minutes, 15 sec intervals of lunge walking 30 sec normal steps.     Supine:   SLR with 4#: 2 sets of 10 reps B  Quadriceps/hip flexor stretch off table: 2 reps with 30 sec holds B  Bridges x 10 reps 2 sets  Adductor stretch 3 reps 30 sec hold B  KTC 5 reps B, use towel to improve knee bend  BKTC x 10 reps     Prone:  Hip extension: 10 reps B  Hamstring curls: 10 reps B  Knee flexion stretch with sheet: 3 reps with 30 sec hold B  Knee flexion 5# 5 reps B     Seated:  LAQ B x 10 reps  Hamstring stretch: 2 reps with 30 sec holds B     Standing:   Mini squats with blue TB: 2 sets of 10 reps  Back squats with 5# bar: 10 reps  Knee flexion and extension stretch on step (with 3 risers each side): 5 reps each B  Hip extension with 4# : 2 sets of 10 reps B  Hip abduction with 4#: 2 sets of 10 reps B  Marches with 4#: 2 sets of 10 reps B  TKE with blue TB: 10 reps B  Step ups (3 risers each side) with hip and knees at 90/90 while holding 5# dumbbell in each hand: 10 reps B  Side steps with blue TB: 15 feet x 4 each direction   Monsters walks with blue TB: 10 feet x 4 each direction; increased cues for form  Lunges: 8 reps B    Manual Therapy: for joint mobilization/manipulations and soft tissue mobilization  B Knee flexion stretch in prone   Patellar mobs medial-lateral, superior-inferior; grade 2, 3  STM to distal hamstrings B     Neuro Re-Education: to improve movement, balance, coordination, kinesthetic sense, posture, and proprioception for sitting or standing balance  Kinesiotape applied to right knee in two horizontal C strips at either side of patella for additional support. Patient educated on purpose and proper removal of taping with good understanding verbalized. Pain relief with taping. SLS RLE and LLE on green foam with two finger support: 10 seconds x 3 each  NBOS on green foam with eyes closed, no UE support: 10 seconds x 2    Activity tolerance and post treatment pain report:   Good  Pain Out: 2/10 right knee; 0/10 left knee    Education:  Education was provided to the patient on the following topics. continue HEP  [x]    No changes were made to the home exercise program.  []    The following changes were made to the home exercise program  Patient verbalized understanding of the topics presented. ASSESSMENT:   Patient presents with decreased knee pain since evaluation. Right knee is more stiff and painful than left. Right knee occasionally feels unstable like it will buckle. Bilateral knee ROM remains very limited, but significant improvement since evaluation.  Patient with good response to kinesiotape to right knee; he has purchased tape for home. Patient tolerated clinic exercises well. Patient challenged with neuromuscular reeducation exercises today. Patient reports regular performance of HEP and is going to return to the gym to advance exercises. Patient to see Dr Preston Glover on Dec 3rd. Good participation noted. Patients progression toward goals is as follows:  [x]     Improving appropriately and progressing toward goals  []     Improving slowly and progressing toward goals  []     Not making progress toward goals and plan of care will be adjusted    PLAN OF CARE:   Patient continues to benefit from skilled intervention to address the above impairments. [x]    Continue treatment per established plan of care.   []     Recommend the following changes to the plan of care    Recommendations/Intent for next treatment: neuromuscular reeducation     Brigid Barbosa, PT   Time Calculation: 35 mins  Patient Time in clinic:   Start Time: 0830   Stop Time: 6713

## 2019-11-18 ENCOUNTER — HOSPITAL ENCOUNTER (OUTPATIENT)
Dept: PHYSICAL THERAPY | Age: 69
Discharge: HOME | End: 2019-11-18
Payer: MEDICARE

## 2019-11-18 PROCEDURE — 97110 THERAPEUTIC EXERCISES: CPT

## 2019-11-18 NOTE — PROGRESS NOTES
[facility]  [address], [address] [facility]    OUTPATIENT PHYSICAL THERAPY DAILY TREATMENT NOTE  VISIT: 10    NAME: Patricia Holt AGE: 81 y.o. GENDER: male  DATE: 11/18/2019  REFERRING PHYSICIAN: Hermann Alfredo MD      GOALS  Short term goals  Time frame: 4 weeks  1. Patient will be compliant and independent with the initial HEP as evidenced by being able to perform without cuing. 2. Patient will report a 50% improvement in symptoms. 3. Patient report a 50% improvement in sleeping. 4. Patient will have a 20degree increase in B/L knee  ROM. [place] [facility] 5. Patient will tolerate 15 minutes of clinic activities before onset of symptoms.      Long term goals  Time frame: 8 weeks  1. Patient will report pain level decrease to 3/10 to allow increased ease of movement. 2. Patient will have an improved score on the LEFS outcome measure by 20 points to demonstrate an increase in functional activity tolerance. 3. Patient will be independent in final individualized HEP. 4. Patient will have an increase in knee ROM to 100  degrees  5. Patient will return to walking without being limited by symptoms. 6. Patient will sleep 6-8 hours without being interrupted by pain.        SUBJECTIVE:   \"My right knee feels better with the tape\"    Pain In: 3/10 right knee; 1/10 left knee    OBJECTIVE DATA SUMMARY:   Objective data from initial evaluation:   EXAMINATION/PRESENTATION/DECISION MAKING:   Pain:  Location: top of patella B/L  Quality: aching  Now: 7/10  Worst:8/10  Factors that improve pain: rest     Posture:   Standing posture WNL     Strength:   B/L Knee flexion and knee extension 5/5     Range of Motion:   Left knee AAROM in supine  Flexion 70 degrees (Updated 11/1/19: 88 degrees)  Extension -5 degrees (Updated 11/1/19: 0 degrees)     Right knee AAROM in supine:   Flexion 65 degrees (Updated 11/1/19: 92 degrees)  Extension -5 degrees (Updated 11/1/19: 0 degrees)     Spinal Assessment: WNL     Joint Mobility:   Patella mobility  B/L Med- Lateral mild hypomobility  B/L Sup-inf, painful and hypomobile     Palpation:   TTP superior border of patella  b/L     Neurologic Assessment:               Tone: normal               Sensation: WNL               Reflexes: not tested     Special Tests:   None this date     Mobility:               Transitional Movements: WFL                Gait: WFL     Balance:  Normal     Functional Measure:   LEFS 48/80    11/18/19 10th visit  LEFS 67/80     TREATMENT/INTERVENTION:  Modalities (Rationale): MHP to bilateral knees for 8 minutes at end of session; to decrease pain and increase flexibility; no skin irritation noted held this date     Therapeutic Exercises: to develop strength, endurance, range of motion, and flexibility  HEP Wall slide, hip flexor stretch, bridges, adductor stretch, knee extension stretch (support under heel), KTC, prone hip extension, prone knee flexion AROM, seated knee stretch, squats  Added to HEP 11/8/19: leg press machine (bilateral and single leg); leg extension machine (bialteral and single leg); leg curls machine (bilateral and single leg); back squat; step up (knee and hip 90/90); side steps with TB (blue TB dispensed); monsters walks with TB (forward and backward steps)     Exercises in BOLD performed this date:      UBE/LBE: 5 minutes, level 4, Seat at 10    TM x 3.5 minutes, 15 sec intervals of lunge walking 30 sec normal steps.     Supine:   SLR with 4#: 2 sets of 10 reps B  Quadriceps/hip flexor stretch off table: 2 reps with 30 sec holds B  Bridges with 10#  x 15  Adductor stretch 3 reps 30 sec hold B  KTC 5 reps B, use towel to improve knee bend  BKTC x 10 reps     Prone:  Hip extension x 5 reps: 10 reps B  Hamstring curls with 4#: 10 reps B  Knee flexion stretch with sheet: 3 reps with 30 sec hold B       Seated:  LAQ B x 10 reps  Hamstring stretch: 2 reps with 30 sec holds B     Standing:   Mini squats with blue TB: 2 sets of 10 reps  Back squats with 5# bar: 10 reps  Knee flexion and extension stretch on step (with 3 risers each side): 5 reps each B  Hip extension with 4# : 2 sets of 10 reps B  Hip abduction with 4#: 2 sets of 10 reps B  Marches with 4#: 2 sets of 10 reps B  TKE with blue TB: 10 reps B  Step ups (3 risers each side) with hip and knees at 90/90 while holding 5# dumbbell in each hand: 10 reps B  Side steps with blue TB: 15 feet x 4 each direction   Monsters walks with blue TB: 10 feet x 4 each direction; increased cues for form  Lunges:on step with 3 risers, max flexion to max extension    Manual Therapy: for joint mobilization/manipulations and soft tissue mobilization  B Knee flexion stretch in prone, 3 reps 20 sec hold  Patellar mobs medial-lateral, superior-inferior; grade 2, 3  STM to distal hamstrings B     Neuro Re-Education: to improve movement, balance, coordination, kinesthetic sense, posture, and proprioception for sitting or standing balance  Kinesiotape applied to right knee in two horizontal C strips at either side of patella for additional support. Patient educated on purpose and proper removal of taping with good understanding verbalized. Pain relief with taping. SLS RLE and LLE on green foam with two finger support: 10 seconds x 3 each  NBOS on green foam with eyes closed, no UE support: 10 seconds x 2    Activity tolerance and post treatment pain report:   Good  Pain Out: 2/10 right knee; 0/10 left knee    Education:  Education was provided to the patient on the following topics. continue HEP  [x]    No changes were made to the home exercise program.  []    The following changes were made to the home exercise program  Patient verbalized understanding of the topics presented. ASSESSMENT:   Patient presents with decreased knee pain since evaluation. Right knee is more stiff and painful than left. Bilateral knee ROM remains very limited, but significant improvement since evaluation.  Pt has purchased tape and arrives with right knee taped correctly. Patient tolerated clinic exercises well. Patient challenged with neuromuscular reeducation exercises today. Patient reports regular performance of HEP and plans to return to gym . Patient to see Dr Jackie Stevens on Dec 3rd. Good participation noted. Patients progression toward goals is as follows:  [x]     Improving appropriately and progressing toward goals  []     Improving slowly and progressing toward goals  []     Not making progress toward goals and plan of care will be adjusted    PLAN OF CARE:   Patient continues to benefit from skilled intervention to address the above impairments. [x]    Continue treatment per established plan of care.   []     Recommend the following changes to the plan of care    Recommendations/Intent for next treatment: neuromuscular reeducation     Yessica Reyna, PT   Time Calculation: 40 mins  Patient Time in clinic:   Start Time: 0800   Stop Time: 9774

## 2019-11-22 ENCOUNTER — HOSPITAL ENCOUNTER (OUTPATIENT)
Dept: PHYSICAL THERAPY | Age: 69
Discharge: HOME | End: 2019-11-22
Payer: MEDICARE

## 2019-11-22 PROCEDURE — 97110 THERAPEUTIC EXERCISES: CPT | Performed by: PHYSICAL THERAPIST

## 2019-11-22 NOTE — PROGRESS NOTES
[facility]  [address], [address] [facility]    OUTPATIENT PHYSICAL THERAPY DAILY TREATMENT NOTE  VISIT: 11    NAME: Chava Griffin AGE: 81 y.o. GENDER: male  DATE: 11/22/2019  REFERRING PHYSICIAN: Brandi Wood MD      GOALS  Short term goals  Time frame: 4 weeks  1. Patient will be compliant and independent with the initial HEP as evidenced by being able to perform without cuing. 2. Patient will report a 50% improvement in symptoms. 3. Patient report a 50% improvement in sleeping. 4. Patient will have a 20degree increase in B/L knee  ROM. Ivone Cardoza 5. Patient will tolerate 15 minutes of clinic activities before onset of symptoms.      Long term goals  Time frame: 8 weeks  1. Patient will report pain level decrease to 3/10 to allow increased ease of movement. 2. Patient will have an improved score on the LEFS outcome measure by 20 points to demonstrate an increase in functional activity tolerance. 3. Patient will be independent in final individualized HEP. 4. Patient will have an increase in knee ROM to 100  degrees  5. Patient will return to walking without being limited by symptoms. 6. Patient will sleep 6-8 hours without being interrupted by pain.        SUBJECTIVE:   \"My right knee still feels a little unstable at times but the tape helps\"    Pain In: 0/10 right knee; 0/10 left knee    OBJECTIVE DATA SUMMARY:   Objective data from initial evaluation:   EXAMINATION/PRESENTATION/DECISION MAKING:   Pain:  Location: top of patella B/L  Quality: aching  Now: 7/10  Worst:8/10  Factors that improve pain: rest     Posture:   Standing posture WNL     Strength:   B/L Knee flexion and knee extension 5/5     Range of Motion:   Left knee AAROM in supine  Flexion 70 degrees (Updated 11/22/19: 95 degrees)  Extension -5 degrees (Updated 11/1/19: 0 degrees)     Right knee AAROM in supine:   Flexion 65 degrees (Updated 11/22/19: 95 degrees)   Extension -5 degrees (Updated 11/1/19: 0 degrees)     Spinal Assessment: WNL     Joint Mobility:   Patella mobility  B/L Med- Lateral mild hypomobility  B/L Sup-inf, painful and hypomobile     Palpation:   TTP superior border of patella  b/L     Neurologic Assessment:               Tone: normal               Sensation: WNL               Reflexes: not tested     Special Tests:   None this date     Mobility:               Transitional Movements: WFL                Gait: WFL     Balance:  Normal     Functional Measure:   LEFS 48/80  LEFS 67/80 on 11/18/19 (10th visit)     TREATMENT/INTERVENTION:  Modalities (Rationale): MHP to bilateral knees for 10 minutes at end of session; to decrease pain and increase flexibility; no skin irritation noted held this date     Therapeutic Exercises: to develop strength, endurance, range of motion, and flexibility  HEP Wall slide, hip flexor stretch, bridges, adductor stretch, knee extension stretch (support under heel), KTC, prone hip extension, prone knee flexion AROM, seated knee stretch, squats  Added to HEP 11/8/19: leg press machine (bilateral and single leg); leg extension machine (bialteral and single leg); leg curls machine (bilateral and single leg); back squat; step up (knee and hip 90/90); side steps with TB (blue TB dispensed); monsters walks with TB (forward and backward steps)     Exercises in BOLD performed this date:      UBE/LBE: 6 minutes, level 4 (seat at 9)    TM x 3.5 minutes, 15 sec intervals of lunge walking 30 sec normal steps.     Supine:   SLR with 4#: 2 sets of 10 reps B  Quadriceps/hip flexor stretch off table: 2 reps with 30 sec holds B  Bridges with 10#  x 15  Adductor stretch 3 reps 30 sec hold B  KTC 5 reps B, use towel to improve knee bend  BKTC x 10 reps     Prone:  Hip extension: 2 sets of 10 reps B  Hamstring curls with 4#: 10 reps B  Knee flexion stretch with sheet: 3 reps with 30 sec hold B    Seated:  LAQ B x 10 reps  Hamstring stretch: 2 reps with 30 sec holds B     Standing:   Mini squats with blue TB: 2 sets of 10 reps  Back squats with 5# bar: 10 reps  Knee flexion and extension stretch on step (with 3 risers each side): 5 reps each B  Hip extension with 4# : 2 sets of 10 reps B  Hip abduction with 4#: 2 sets of 10 reps B  Marches with 4#: 2 sets of 10 reps B  TKE with blue TB: 10 reps B  Step ups (3 risers each side) with hip and knees at 90/90 while holding 5# dumbbell in each hand: 10 reps B  Side steps with blue TB: 15 feet x 4 each direction   Monsters walks with blue TB: 10 feet x 4 each direction; increased cues for form  Lunges:on step with 3 risers, max flexion to max extension    Manual Therapy: for joint mobilization/manipulations and soft tissue mobilization  B Knee flexion stretch in prone: 3 reps with 30 sec hold  Patellar mobs medial-lateral, superior-inferior; grade 2, 3  STM to distal hamstrings B     Neuro Re-Education: to improve movement, balance, coordination, kinesthetic sense, posture, and proprioception for sitting or standing balance  Kinesiotape applied to right knee in two horizontal C strips at either side of patella for additional support. Patient educated on purpose and proper removal of taping with good understanding verbalized. Pain relief with taping. SLS RLE and LLE on green foam with two finger support: 10 seconds x 3 each  NBOS on green foam with eyes closed, no UE support: 10 seconds x 2    Activity tolerance and post treatment pain report:   Good  Pain Out: 0/10 right knee; 0/10 left knee    Education:  Education was provided to the patient on the following topics. continue HEP  [x]    No changes were made to the home exercise program.  []    The following changes were made to the home exercise program  Patient verbalized understanding of the topics presented. ASSESSMENT:   Patient presents with no knee pain today. He continues to report some instability in right knee. Improved bilateral knee ROM to 95 degrees.  Patient continues to have improved stability and pain relief with taping. Patient tolerated clinic exercises well. Patient reports regular performance of HEP and plans to return to gym . Patient to see Dr Chantal Jeffries on Dec 2nd. Good participation noted. Patient to be discharged in two visits. Patients progression toward goals is as follows:  [x]     Improving appropriately and progressing toward goals  []     Improving slowly and progressing toward goals  []     Not making progress toward goals and plan of care will be adjusted    PLAN OF CARE:   Patient continues to benefit from skilled intervention to address the above impairments. [x]    Continue treatment per established plan of care.   []     Recommend the following changes to the plan of care    Recommendations/Intent for next treatment: discharge in two visits     Rosalba Hall PT   Time Calculation: 60 mins  Patient Time in clinic:   Start Time: 0830   Stop Time: 0930

## 2019-11-25 ENCOUNTER — HOSPITAL ENCOUNTER (OUTPATIENT)
Dept: PHYSICAL THERAPY | Age: 69
Discharge: HOME | End: 2019-11-25
Payer: MEDICARE

## 2019-11-25 PROCEDURE — 97112 NEUROMUSCULAR REEDUCATION: CPT | Performed by: PHYSICAL THERAPIST

## 2019-11-25 PROCEDURE — 97110 THERAPEUTIC EXERCISES: CPT | Performed by: PHYSICAL THERAPIST

## 2019-11-25 NOTE — PROGRESS NOTES
[facility]  [address], [address] [facility]    OUTPATIENT PHYSICAL THERAPY DAILY TREATMENT NOTE  VISIT: 12    NAME: Efe Rudd AGE: 81 y.o. GENDER: male  DATE: 11/25/2019  REFERRING PHYSICIAN: Vika Cowan MD      GOALS  Short term goals  Time frame: 4 weeks  1. Patient will be compliant and independent with the initial HEP as evidenced by being able to perform without cuing. MET  2. Patient will report a 50% improvement in symptoms. MET  3. Patient report a 50% improvement in sleeping. MET  4. Patient will have a 20 degree increase in B/L knee  ROM. MET  5. Patient will tolerate 15 minutes of clinic activities before onset of symptoms. MET      Long term goals  Time frame: 8 weeks  1. Patient will report pain level decrease to 3/10 to allow increased ease of movement. 2. Patient will have an improved score on the LEFS outcome measure by 20 points to demonstrate an increase in functional activity tolerance. 3. Patient will be independent in final individualized HEP. 4. Patient will have an increase in knee ROM to 100  degrees  5. Patient will return to walking without being limited by symptoms. 6. Patient will sleep 6-8 hours without being interrupted by pain.        SUBJECTIVE:   \"My right knee still feels like it catches, but no real pain\"    Pain In: 0/10 right knee; 0/10 left knee    OBJECTIVE DATA SUMMARY:   Objective data from initial evaluation:   EXAMINATION/PRESENTATION/DECISION MAKING:   Pain:  Location: top of patella B/L  Quality: aching  Now: 7/10  Worst:8/10  Factors that improve pain: rest     Posture:   Standing posture WNL     Strength:   B/L Knee flexion and knee extension 5/5     Range of Motion:   Left knee AAROM in supine  Flexion 70 degrees (Updated 11/22/19: 95 degrees)  Extension -5 degrees (Updated 11/1/19: 0 degrees)     Right knee AAROM in supine:   Flexion 65 degrees (Updated 11/22/19: 95 degrees)   Extension -5 degrees (Updated 11/1/19: 0 degrees)     Spinal Assessment: WNL     Joint Mobility:   Patella mobility  B/L Med- Lateral mild hypomobility  B/L Sup-inf, painful and hypomobile     Palpation:   TTP superior border of patella  b/L     Neurologic Assessment:               Tone: normal               Sensation: WNL               Reflexes: not tested     Special Tests:   None this date     Mobility:               Transitional Movements: WFL                Gait: WFL     Balance:  Normal     Functional Measure:   LEFS 48/80  LEFS 67/80 on 11/18/19 (10th visit)     TREATMENT/INTERVENTION:  Modalities (Rationale): MHP to bilateral knees for 8 minutes at end of session; to decrease pain and increase flexibility; no skin irritation noted held this date     Therapeutic Exercises: to develop strength, endurance, range of motion, and flexibility  HEP Wall slide, hip flexor stretch, bridges, adductor stretch, knee extension stretch (support under heel), KTC, prone hip extension, prone knee flexion AROM, seated knee stretch, squats  Added to HEP 11/8/19: leg press machine (bilateral and single leg); leg extension machine (bialteral and single leg); leg curls machine (bilateral and single leg); back squat; step up (knee and hip 90/90); side steps with TB (blue TB dispensed); monsters walks with TB (forward and backward steps)     Exercises in BOLD performed this date:      UBE/LBE: 5 minutes, level 4 (seat at 9)    TM x 3.5 minutes, 15 sec intervals of lunge walking 30 sec normal steps.     Supine:   SLR with 4#: 2 sets of 10 reps B  Quadriceps/hip flexor stretch off table: 2 reps with 30 sec holds B  Bridges with 10#  x 15  Adductor stretch 3 reps 30 sec hold B  KTC 5 reps B, use towel to improve knee bend  BKTC x 10 reps     Prone:  Hip extension: 2 sets of 10 reps B  Hamstring curls with 4#: 10 reps B  Knee flexion stretch with sheet: 3 reps with 30 sec hold B    Seated:  LAQ B x 10 reps  Hamstring stretch: 2 reps with 30 sec holds B     Standing:   Mini squats with blue TB: 2 sets of 10 reps  Back squats with 5# bar: 10 reps  Knee flexion and extension stretch on step (with 3 risers each side): 5 reps each B  Hip extension with 4# : 2 sets of 10 reps B  Hip abduction with 4#: 2 sets of 10 reps B  Marches with 4#: 2 sets of 10 reps B  TKE with blue TB: 10 reps B  Step ups (3 risers each side) with hip and knees at 90/90 while holding 5# dumbbell in each hand: 10 reps B  Side steps with blue TB: 15 feet x 4 each direction   Monsters walks with blue TB: 10 feet x 4 each direction; increased cues for form  Lunges/hip flexor stretch: 2 reps with 20 sec holds B    Manual Therapy: for joint mobilization/manipulations and soft tissue mobilization  B Knee flexion stretch in prone: 3 reps with 30 sec hold  Patellar mobs medial-lateral, superior-inferior; grade 2, 3  STM to distal hamstrings B     Neuro Re-Education: to improve movement, balance, coordination, kinesthetic sense, posture, and proprioception for sitting or standing balance  Kinesiotape applied to right knee in two horizontal C strips at either side of patella for additional support. Patient educated on purpose and proper removal of taping with good understanding verbalized. Pain relief with taping. SLS RLE and LLE on level surface with no UE support: 5 reps each side  SLS RLE and LLE on green foam with two finger support: 10 seconds x 5 each  NBOS on green foam with eyes closed, no UE support: 4 reps; 20-30 seconds each  Marches on green foam with eyes open, no UE support: 20 reps  Perturbations on green foam with eyes open, no UE support: 30 seconds    Activity tolerance and post treatment pain report:   Good  Pain Out: 0/10 right knee; 0/10 left knee    Education:  Education was provided to the patient on the following topics. continue HEP  [x]    No changes were made to the home exercise program.  []    The following changes were made to the home exercise program  Patient verbalized understanding of the topics presented. ASSESSMENT:   Patient presents with no knee pain today. He continues to report some instability in right knee. Improved bilateral knee ROM to 95 degrees. Patient continues to have improved stability and pain relief with taping. Patient tolerated clinic exercises well. Challenged with neuromuscular reeducation activities; more difficult with RLE. Patient reports regular performance of HEP and plans to return to gym . Patient to see Dr Noris Carmona on Dec 2nd. Good participation noted. Patient to be discharged next session. Patients progression toward goals is as follows:  [x]     Improving appropriately and progressing toward goals  []     Improving slowly and progressing toward goals  []     Not making progress toward goals and plan of care will be adjusted    PLAN OF CARE:   Patient continues to benefit from skilled intervention to address the above impairments. [x]    Continue treatment per established plan of care.   []     Recommend the following changes to the plan of care    Recommendations/Intent for next treatment: discharge next session    Chrystal Shannon, PT   Time Calculation: 50 mins  Patient Time in clinic:   Start Time: 0800   Stop Time: 4649

## 2019-11-29 ENCOUNTER — HOSPITAL ENCOUNTER (OUTPATIENT)
Dept: PHYSICAL THERAPY | Age: 69
Discharge: HOME | End: 2019-11-29
Payer: MEDICARE

## 2019-11-29 PROCEDURE — 97110 THERAPEUTIC EXERCISES: CPT | Performed by: PHYSICAL THERAPIST

## 2019-11-29 NOTE — PROGRESS NOTES
[facility]  [address], [address] [facility]    OUTPATIENT PHYSICAL THERAPY DAILY TREATMENT NOTE WITH DISCHARGE  VISIT: 13    NAME: Rigoberto Traylor AGE: 81 y.o. GENDER: male  DATE: 11/29/2019  REFERRING PHYSICIAN: Osbaldo Padilla MD      GOALS  Short term goals  Time frame: 4 weeks  1. Patient will be compliant and independent with the initial HEP as evidenced by being able to perform without cuing. MET  2. Patient will report a 50% improvement in symptoms. MET  3. Patient report a 50% improvement in sleeping. MET  4. Patient will have a 20 degree increase in B/L knee  ROM. MET  5. Patient will tolerate 15 minutes of clinic activities before onset of symptoms. MET      Long term goals  Time frame: 8 weeks  1. Patient will report pain level decrease to 3/10 to allow increased ease of movement. MET  2. Patient will have an improved score on the LEFS outcome measure by 20 points to demonstrate an increase in functional activity tolerance. Partially MET (19 points)  3. Patient will be independent in final individualized HEP. MET  4. Patient will have an increase in knee ROM to 100  degrees. Partially MET (95 degrees B)  5. Patient will return to walking without being limited by symptoms. MET  6. Patient will sleep 6-8 hours without being interrupted by pain.  MET       SUBJECTIVE:   \"My right knee still feels like it catches, but no real pain\"    Pain In: 0/10 right knee; 0/10 left knee    OBJECTIVE DATA SUMMARY:      EXAMINATION/PRESENTATION/DECISION MAKING:   Pain:  Location: B/L knees  Quality: aching  Now: 0/10  Worst:3/10  Factors that improve pain: rest, exercises     Posture:   Standing posture WNL     Strength:   B/L Knee flexion and knee extension 5/5     Range of Motion:   Left knee AAROM in supine  Flexion 70 degrees (Updated 11/29/19: 95 degrees)  Extension -5 degrees (Updated 11/1/19: 0 degrees)     Right knee AAROM in supine:   Flexion 65 degrees (Updated 11/29/19: 95 degrees)   Extension -5 degrees (Updated 11/1/19: 0 degrees)     Spinal Assessment: WNL     Joint Mobility:   Patella mobility WNL     Palpation:   Mild tenderness at medial right knee     Neurologic Assessment:               Tone: normal               Sensation: WNL               Reflexes: not tested     Special Tests:   (-) Homans     Mobility:               Transitional Movements: WFL                Gait: WFL     Balance:  Normal     Functional Measure:   LEFS 48/80  LEFS 67/80 on 11/18/19 (10th visit)  LEFS: 67/80 on 11/29/19     TREATMENT/INTERVENTION:  Modalities (Rationale): MHP to bilateral knees for 10 minutes at end of session; to decrease pain and increase flexibility; no skin irritation noted held this date     Therapeutic Exercises: to develop strength, endurance, range of motion, and flexibility  HEP Wall slide, hip flexor stretch, bridges, adductor stretch, knee extension stretch (support under heel), KTC, prone hip extension, prone knee flexion AROM, seated knee stretch, squats  Added to HEP 11/8/19: leg press machine (bilateral and single leg); leg extension machine (bialteral and single leg); leg curls machine (bilateral and single leg); back squat; step up (knee and hip 90/90); side steps with TB (blue TB dispensed); monsters walks with TB (forward and backward steps)     Exercises in BOLD performed this date:      UBE/LBE: 5 minutes, level 4 (seat at 9)    TM x 3.5 minutes, 15 sec intervals of lunge walking 30 sec normal steps.     Supine:   SLR with 4#: 2 sets of 10 reps B  Quadriceps/hip flexor stretch off table: 2 reps with 30 sec holds B  Bridges with 10#  x 15  Adductor stretch 3 reps 30 sec hold B  KTC 5 reps B, use towel to improve knee bend  BKTC x 10 reps     Prone:  Hip extension: 2 sets of 10 reps B  Hamstring curls with 4#: 10 reps B  Knee flexion stretch with sheet: 3 reps with 30 sec hold B    Seated:  LAQ B x 10 reps  Hamstring stretch: 2 reps with 30 sec holds B     Standing: Mini squats with blue TB: 2 sets of 10 reps  Back squats with 5# bar: 10 reps  Knee flexion and extension stretch on step (with 3 risers each side): 5 reps each B  Hip extension with 4# : 2 sets of 10 reps B  Hip abduction with 4#: 2 sets of 10 reps B  Marches with 4#: 2 sets of 10 reps B  TKE with blue TB: 10 reps B  Step ups (3 risers each side) with hip and knees at 90/90 while holding 5# dumbbell in each hand: 10 reps B  Side steps with blue TB: 15 feet x 4 each direction   Monsters walks with blue TB: 10 feet x 4 each direction; increased cues for form  Lunges/hip flexor stretch: 2 reps with 20 sec holds B    Manual Therapy: for joint mobilization/manipulations and soft tissue mobilization  B Knee flexion stretch in prone: 3 reps with 30 sec hold  Patellar mobs medial-lateral, superior-inferior; grade 2, 3  STM to distal hamstrings B     Neuro Re-Education: to improve movement, balance, coordination, kinesthetic sense, posture, and proprioception for sitting or standing balance  Kinesiotape applied to right knee in two horizontal C strips at either side of patella for additional support. Patient educated on purpose and proper removal of taping with good understanding verbalized. Pain relief with taping.    SLS RLE and LLE on level surface with no UE support: 5 reps each side  SLS RLE and LLE on green foam with two finger support: 10 seconds x 5 each  NBOS on green foam with eyes closed, no UE support: 4 reps; 20-30 seconds each  Marches on green foam with eyes open, no UE support: 20 reps  Perturbations on green foam with eyes open, no UE support: 30 seconds    Activity tolerance and post treatment pain report:   Good  Pain Out: 0/10 right knee; 0/10 left knee    Education:  Education was provided to the patient on the following topics:.continue HEP  []    No changes were made to the home exercise program.  [x]    The following changes were made to the home exercise program: Patient provided with comprehensive HEP and instructed to call with questions   Patient verbalized understanding of the topics presented. ASSESSMENT:   Patient discharged from physical therapy after 13 visits from 10/16/19 to 11/29/19. Patient presents with no knee pain today. He continues to report some instability in right knee. Improved bilateral knee ROM to 95 degrees. Patient continues to have improved stability and pain relief with taping. Patient tolerated clinic exercises well with good motivation and participation. Patient reports regular performance of HEP and plans to return to gym. Patient to see Dr Antelmo Mackenzie on Dec 2nd. Significant improvement in LEFS outcome measure from 48/80 on evaluation to 67/80 today. Patient in agreement with discharge at this time. PLAN OF CARE:   Patient will be discharged from physical therapy at this time.   Criteria for termination of care:  [x]   Goals Achieved  []   [address]  []   Patient has not returned  []   Other  Plan for follow up, continuing care, or equipment recommendations: Patient instructed to continue exercises at home and at the gym; instructed to call with questions if needed  Thank you for this referral.    Zora Mooney, PT   Time Calculation: 50 mins  Patient Time in clinic:   Start Time: 0800   Stop Time: 4538

## 2021-07-13 ENCOUNTER — HOSPITAL ENCOUNTER (EMERGENCY)
Age: 71
Discharge: STILL ADMITTED OR ADMINISTRATIVE | End: 2021-07-13
Attending: EMERGENCY MEDICINE

## 2022-11-10 ENCOUNTER — ANESTHESIA (OUTPATIENT)
Dept: ENDOSCOPY | Age: 72
End: 2022-11-10
Payer: MEDICARE

## 2022-11-10 ENCOUNTER — ANESTHESIA EVENT (OUTPATIENT)
Dept: ENDOSCOPY | Age: 72
End: 2022-11-10
Payer: MEDICARE

## 2022-11-10 ENCOUNTER — HOSPITAL ENCOUNTER (OUTPATIENT)
Age: 72
Setting detail: OUTPATIENT SURGERY
Discharge: HOME | End: 2022-11-10
Attending: INTERNAL MEDICINE | Admitting: INTERNAL MEDICINE
Payer: MEDICARE

## 2022-11-10 VITALS
WEIGHT: 220 LBS | HEIGHT: 69 IN | HEART RATE: 92 BPM | DIASTOLIC BLOOD PRESSURE: 76 MMHG | TEMPERATURE: 98.2 F | SYSTOLIC BLOOD PRESSURE: 114 MMHG | BODY MASS INDEX: 32.58 KG/M2 | OXYGEN SATURATION: 97 % | RESPIRATION RATE: 28 BRPM

## 2022-11-10 PROCEDURE — 74011250636 HC RX REV CODE- 250/636: Performed by: INTERNAL MEDICINE

## 2022-11-10 PROCEDURE — 74011000250 HC RX REV CODE- 250: Performed by: ANESTHESIOLOGY

## 2022-11-10 PROCEDURE — 77030013992 HC SNR POLYP ENDOSC BSC -B: Performed by: INTERNAL MEDICINE

## 2022-11-10 PROCEDURE — 74011250636 HC RX REV CODE- 250/636: Performed by: ANESTHESIOLOGY

## 2022-11-10 PROCEDURE — 88305 TISSUE EXAM BY PATHOLOGIST: CPT

## 2022-11-10 PROCEDURE — 2709999900 HC NON-CHARGEABLE SUPPLY: Performed by: INTERNAL MEDICINE

## 2022-11-10 PROCEDURE — 76060000031 HC ANESTHESIA FIRST 0.5 HR: Performed by: INTERNAL MEDICINE

## 2022-11-10 PROCEDURE — 76040000019: Performed by: INTERNAL MEDICINE

## 2022-11-10 RX ORDER — PHENYLEPHRINE HCL IN 0.9% NACL 0.4MG/10ML
SYRINGE (ML) INTRAVENOUS AS NEEDED
Status: DISCONTINUED | OUTPATIENT
Start: 2022-11-10 | End: 2022-11-10 | Stop reason: HOSPADM

## 2022-11-10 RX ORDER — ATROPINE SULFATE 0.1 MG/ML
0.5 INJECTION INTRAVENOUS
Status: DISCONTINUED | OUTPATIENT
Start: 2022-11-10 | End: 2022-11-10 | Stop reason: HOSPADM

## 2022-11-10 RX ORDER — PROPOFOL 10 MG/ML
INJECTION, EMULSION INTRAVENOUS AS NEEDED
Status: DISCONTINUED | OUTPATIENT
Start: 2022-11-10 | End: 2022-11-10 | Stop reason: HOSPADM

## 2022-11-10 RX ORDER — ASPIRIN 81 MG/1
81 TABLET ORAL DAILY
COMMUNITY

## 2022-11-10 RX ORDER — MIDAZOLAM HYDROCHLORIDE 1 MG/ML
.25-5 INJECTION, SOLUTION INTRAMUSCULAR; INTRAVENOUS
Status: DISCONTINUED | OUTPATIENT
Start: 2022-11-10 | End: 2022-11-10 | Stop reason: HOSPADM

## 2022-11-10 RX ORDER — EPINEPHRINE 0.1 MG/ML
1 INJECTION INTRACARDIAC; INTRAVENOUS
Status: DISCONTINUED | OUTPATIENT
Start: 2022-11-10 | End: 2022-11-10 | Stop reason: HOSPADM

## 2022-11-10 RX ORDER — DEXTROMETHORPHAN/PSEUDOEPHED 2.5-7.5/.8
1.2 DROPS ORAL
Status: DISCONTINUED | OUTPATIENT
Start: 2022-11-10 | End: 2022-11-10 | Stop reason: HOSPADM

## 2022-11-10 RX ORDER — FLUMAZENIL 0.1 MG/ML
0.2 INJECTION INTRAVENOUS
Status: DISCONTINUED | OUTPATIENT
Start: 2022-11-10 | End: 2022-11-10 | Stop reason: HOSPADM

## 2022-11-10 RX ORDER — EPHEDRINE SULFATE/0.9% NACL/PF 50 MG/5 ML
SYRINGE (ML) INTRAVENOUS AS NEEDED
Status: DISCONTINUED | OUTPATIENT
Start: 2022-11-10 | End: 2022-11-10 | Stop reason: HOSPADM

## 2022-11-10 RX ORDER — SODIUM CHLORIDE 0.9 % (FLUSH) 0.9 %
5-40 SYRINGE (ML) INJECTION AS NEEDED
Status: DISCONTINUED | OUTPATIENT
Start: 2022-11-10 | End: 2022-11-10 | Stop reason: HOSPADM

## 2022-11-10 RX ORDER — SODIUM CHLORIDE 0.9 % (FLUSH) 0.9 %
5-40 SYRINGE (ML) INJECTION EVERY 8 HOURS
Status: DISCONTINUED | OUTPATIENT
Start: 2022-11-10 | End: 2022-11-10 | Stop reason: HOSPADM

## 2022-11-10 RX ORDER — LIDOCAINE HYDROCHLORIDE 20 MG/ML
INJECTION, SOLUTION EPIDURAL; INFILTRATION; INTRACAUDAL; PERINEURAL AS NEEDED
Status: DISCONTINUED | OUTPATIENT
Start: 2022-11-10 | End: 2022-11-10 | Stop reason: HOSPADM

## 2022-11-10 RX ORDER — SODIUM CHLORIDE 9 MG/ML
75 INJECTION, SOLUTION INTRAVENOUS CONTINUOUS
Status: DISCONTINUED | OUTPATIENT
Start: 2022-11-10 | End: 2022-11-10 | Stop reason: HOSPADM

## 2022-11-10 RX ORDER — NALOXONE HYDROCHLORIDE 0.4 MG/ML
0.4 INJECTION, SOLUTION INTRAMUSCULAR; INTRAVENOUS; SUBCUTANEOUS
Status: DISCONTINUED | OUTPATIENT
Start: 2022-11-10 | End: 2022-11-10 | Stop reason: HOSPADM

## 2022-11-10 RX ORDER — FENTANYL CITRATE 50 UG/ML
25 INJECTION, SOLUTION INTRAMUSCULAR; INTRAVENOUS
Status: DISCONTINUED | OUTPATIENT
Start: 2022-11-10 | End: 2022-11-10 | Stop reason: HOSPADM

## 2022-11-10 RX ADMIN — Medication 40 MCG: at 08:37

## 2022-11-10 RX ADMIN — Medication 10 MG: at 08:33

## 2022-11-10 RX ADMIN — LIDOCAINE HYDROCHLORIDE 40 MG: 20 INJECTION, SOLUTION EPIDURAL; INFILTRATION; INTRACAUDAL; PERINEURAL at 08:25

## 2022-11-10 RX ADMIN — PROPOFOL 180 MG: 10 INJECTION, EMULSION INTRAVENOUS at 08:45

## 2022-11-10 RX ADMIN — SODIUM CHLORIDE 75 ML/HR: 9 INJECTION, SOLUTION INTRAVENOUS at 08:18

## 2022-11-10 NOTE — PERIOP NOTES
Endoscopy Case End Note:    0845:  Procedure scope was pre-cleaned, per protocol, at bedside by Brendon Cabrera. 0845:  Report received from anesthesia Jesus Alberto Walls MD.  See anesthesia flowsheet for intra-procedure vital signs and events.

## 2022-11-10 NOTE — PROCEDURES
NAME:  Leeann Travis   :   1950   MRN:   [id number]     Date/Time:  11/10/2022 8:48 AM    Colonoscopy Operative Report    Procedure Type:   Colonoscopy with polypectomy (cold snare)     Indications:     Personal history of colon polyps (screening only)  Pre-operative Diagnosis: see indication above  Post-operative Diagnosis:  See findings below  :  Phi Murillo MD  Referring Provider: Leeanne Alejandre MD    Exam:  Airway: clear, no airway problems anticipated  Heart: RRR, without gallops or rubs  Lungs: clear bilaterally without wheezes, crackles, or rhonchi  Abdomen: soft, nontender, nondistended, bowel sounds present  Mental Status: awake, alert and oriented to person, place and time    Sedation:  MAC anesthesia Propofol 180mg IV  Procedure Details:  After informed consent was obtained with all risks and benefits of procedure explained and preoperative exam completed, the patient was taken to the endoscopy suite and placed in the left lateral decubitus position. Upon sequential sedation as per above, a digital rectal exam was performed demonstrating small internal  hemorrhoids. The Olympus videocolonoscope  was inserted in the rectum and carefully advanced to the cecum, which was identified by the ileocecal valve and appendiceal orifice. The distal terminal ileum was evaluated. The quality of preparation was adequate. The colonoscope was slowly withdrawn with careful evaluation between folds. Retroflexion in the rectum was completed demonstrating small internal hemorrhoids. Findings:     -Normal terminal ileum  -Single 3mm sessile polyp in the ascending colon at 85cm; removed with cold snare  -Small grade 1 internal hemorrhoids    Specimen Removed:  #1 asc colon polyp  Complications: None. EBL:  None.     Impression:    -Normal terminal ileum  -Single 3mm sessile polyp in the ascending colon at 85cm; removed with cold snare  -Small grade 1 internal hemorrhoids    Recommendations: --Await pathology. , -Repeat colonoscopy in 5 years. High fiber diet. Resume normal medication(s). You will receive a letter about the biopsy results in about 10 days. You may be asked to call your doctor's office for the results. Discharge Disposition:  Home in the company of a  when able to ambulate.     Wilda Piedra MD

## 2022-11-10 NOTE — ANESTHESIA PREPROCEDURE EVALUATION
Relevant Problems   CARDIOVASCULAR   (+) Heart murmur   (+) Hypertension   (+) PVC (premature ventricular contraction)   (+) Sinus bradycardia       Anesthetic History   No history of anesthetic complications            Review of Systems / Medical History  Patient summary reviewed, nursing notes reviewed and pertinent labs reviewed    Pulmonary  Within defined limits            Pertinent negatives: No shortness of breath     Neuro/Psych   Within defined limits           Cardiovascular    Hypertension        Dysrhythmias : PVC  Hyperlipidemia  Pertinent negatives: No angina  Exercise tolerance: >4 METS     GI/Hepatic/Renal  Within defined limits              Endo/Other    Diabetes: type 2    Obesity and arthritis     Other Findings            Physical Exam    Airway  Mallampati: II  TM Distance: 4 - 6 cm  Neck ROM: normal range of motion   Mouth opening: Normal     Cardiovascular  Regular rate and rhythm,  S1 and S2 normal,  no murmur, click, rub, or gallop  Rhythm: regular  Rate: normal         Dental  No notable dental hx       Pulmonary  Breath sounds clear to auscultation               Abdominal  GI exam deferred       Other Findings            Anesthetic Plan    ASA: 3  Anesthesia type: MAC          Induction: Intravenous  Anesthetic plan and risks discussed with: Patient

## 2022-11-10 NOTE — DISCHARGE INSTRUCTIONS
[facility]  [id number]  1950    COLON DISCHARGE INSTRUCTIONS  Discomfort:  Redness at IV site- apply warm compress to area; if redness or soreness persist- contact your physician  There may be a slight amount of blood passed from the rectum  Gaseous discomfort- walking, belching will help relieve any discomfort  You may not operate a vehicle for 12 hours  You may not engage in an occupation involving machinery or appliances for rest of today  You may not drink alcoholic beverages for at least 12 hours  Avoid making any critical decisions for at least 24 hour  DIET:   High fiber diet. - however -  remember your colon is empty and a heavy meal will produce gas. Avoid these foods:  vegetables, fried / greasy foods, carbonated drinks for today  MEDICATION:         ACTIVITY:  You may not resume your normal daily activities until tomorrow AM; it is recommended that you spend the remainder of the day resting -  avoid any strenuous activity. CALL M.D.   ANY SIGN OF:   Increasing pain, nausea, vomiting  Abdominal distension (swelling)  New increased bleeding (oral or rectal)  Fever (chills)  Pain in chest area  Bloody discharge from nose or mouth  Shortness of breath    IMPRESSION:  -Normal terminal ileum  -Single 3mm sessile polyp in the ascending colon at 85cm; removed with cold snare  -Small grade 1 internal hemorrhoids    Follow-up Instructions:   Call Dr. Rolf Parks for the results of procedure / biopsy in 7-10 days  Telephone # [phone]  Repeat colonoscopy in 5 years    Angy Cantrell MD     Patient Education on Sedation / Analgesia Administered for Procedure      For 24 hours after general anesthesia or intravenous analgesia / sedation:  Have someone responsible help you with your care  Limit your activities  Do not drive and operate hazardous machinery  Do not make important personal, legal or business decisions  Do not drink alcoholic beverages  If you have not urinated within 8 hours after discharge, please contact your physician  Resume your medications unless otherwise instructed    For 24 hours after general anesthesia or intravenous analgesia / sedation  you may experience:  Drowsiness, dizziness, sleepiness, or confusion  Difficulty remembering or delayed reaction times  Difficulty with your balance, especially while walking, move slowly and carefully, do not make sudden position changes  Difficulty focusing or blurred vision    You may not be aware of slight changes in your behavior and/or your reaction time because of the medication used during and after your procedure.     Report the following to your physician:  Excessive pain, swelling, redness or odor of or around the surgical area  Temperature over 100.5  Nausea and vomiting lasting longer than 4 hours or if unable to take medications  Any signs of decreased circulation or nerve impairment to extremity: change in color, persistent numbness, tingling, coldness or increase pain  Any questions or concerns    IF YOU REPORT TO AN EMERGENCY ROOM, DOCTOR'S OFFICE OR HOSPITAL WITHIN 24 HOURS AFTER YOUR PROCEDURE, BRING THIS SHEET AND YOUR AFTER VISIT SUMMARY WITH YOU AND GIVE IT TO THE PHYSICIAN OR NURSE ATTENDING YOU.

## 2022-11-10 NOTE — H&P
Gastroenterology Outpatient History and Physical    Patient: Renee Morales    Physician: Myranda Wallis MD    Chief Complaint: pers hx of colon adenomas  History of Present Illness: 69yo M with pers hx of colon adenomas. Last colonoscop y     History:  Past Medical History:   Diagnosis Date    Arthritis     Diabetes ([address]     Heart murmur 3/18/2011    Hypertension     Other ill-defined conditions(799.89)     HYPERLIPEDEMIA    PVC (premature ventricular contraction) 3/18/2011    Sinus bradycardia 3/18/2011      Past Surgical History:   Procedure Laterality Date    HX ORTHOPAEDIC  2010    RIGHT TKR    HX UROLOGICAL      TESTICLE REMOVED      Social History     Socioeconomic History    Marital status:    Tobacco Use    Smoking status: Former     Types: Cigarettes     Quit date: 1983     Years since quittin.8    Smokeless tobacco: Never   Substance and Sexual Activity    Alcohol use: No    No family history on file. Patient Active Problem List   Diagnosis Code    PVC (premature ventricular contraction) I49.3    Sinus bradycardia R00.1    Heart murmur R01.1    Hypertension I10    Hyperlipidemia E78.5    Hyperosmolar (nonketotic) coma (HCC) E11.01       Allergies: Allergies   Allergen Reactions    Other Medication Itching     POWDER IN SURGICAL GLOVES; NOT ALLERGIC TO LATEX     Medications:   Prior to Admission medications    Medication Sig Start Date End Date Taking? Authorizing Provider   metFORMIN (GLUCOPHAGE) 500 mg tablet Take 1 Tab by mouth two (2) times daily (with meals). 14   Yamilex Ball MD   insulin glargine (LANTUS SOLOSTAR) 100 unit/mL (3 mL) pen 22 Units by SubCUTAneous route nightly.  14   Yamilex Ball MD   insulin lispro (HUMALOG KWIKPEN) 100 unit/mL kwikpen 4 units sc before each meals and   Plus sliding scale: 150-200 2 units, 201-250 4 units, 251-300 6 units, 301-350 8 units, 351-400 10 units, > 400 10 units and call your docotr 14   Yamilex Ball MD   Lancets misc For finger sticks 5/6/14   Eryn Santoro MD   glucose blood VI test strips (GLUCOLAB) strip [place] per his glucometer 5/6/14   Eryn Santoro MD   Insulin Needles, Disposable, (INSULIN PEN NEEDLE) 31 ndle For insulin pen 5/6/14   Eryn Santoro MD   loratadine (CLARITIN) 10 mg tablet Take 10 mg by mouth daily as needed for Allergies. Provider, Historical   montelukast (SINGULAIR) 10 mg tablet Take 10 mg by mouth daily as needed. Provider, Historical   torsemide (DEMADEX) 10 mg tablet Take 10 mg by mouth daily. Provider, Historical   atorvastatin (LIPITOR) 20 mg tablet Take 20 mg by mouth daily. Provider, Historical   potassium chloride (KLOR-CON M20) 20 mEq tablet Take 20 mEq by mouth daily. 3/2/11   Provider, Historical   NIFEdipine (PROCARDIA) 20 mg capsule Take 20 mg by mouth daily (with breakfast). 3/2/11   Provider, Historical   lisinopril (PRINIVIL, ZESTRIL) 20 mg tablet Take 20 mg by mouth daily. Provider, Historical   terazosin (HYTRIN) 10 mg capsule Take 10 mg by mouth nightly. Provider, Historical   MULTIVITS-MINERALS/FA/LYCOPENE (ONE-A-DAY MEN'S PO) Take 1 Tab by mouth daily. 3/2/11   Provider, Historical   bisacodyl (DULCOLAX) 5 mg EC tablet Take 5 mg by mouth daily as needed. Provider, Historical     Physical Exam:   Vital Signs: There were no vitals taken for this visit.   General: well developed, well nourished   HEENT: unremarkable   Heart: regular rhythm no mumur    Lungs: clear   Abdominal:  benign   Neurological: unremarkable   Extremities: no edema     Findings/Diagnosis: pers hx of colon adenomas  Plan of Care/Planned Procedure: Colonoscopy with conscious/deep sedation    Signed:  Opal Pham MD 11/10/2022

## 2022-11-10 NOTE — ANESTHESIA POSTPROCEDURE EVALUATION
Procedure(s):  COLONOSCOPY  ENDOSCOPIC POLYPECTOMY. MAC, total IV anesthesia    Anesthesia Post Evaluation        Patient location during evaluation: PACU  Note status: Adequate. Level of consciousness: responsive to verbal stimuli and sleepy but conscious  Pain management: satisfactory to patient  Airway patency: patent  Anesthetic complications: no  Cardiovascular status: acceptable  Respiratory status: acceptable  Hydration status: acceptable  Comments: +Post-Anesthesia Evaluation and Assessment    Patient: Phyllis Morgan MRN: [id number]  SSN: [id number]   YOB: 1950  Age: 81 y.o. Sex: male      Cardiovascular Function/Vital Signs    /76   Pulse 92   Temp 36.8 °C (98.2 °F)   Resp 28   Ht 5' 9\" (1.753 m)   Wt 99.8 kg (220 lb)   SpO2 97%   BMI 32.49 kg/m²     Patient is status post Procedure(s):  COLONOSCOPY  ENDOSCOPIC POLYPECTOMY. Nausea/Vomiting: Controlled. Postoperative hydration reviewed and adequate. Pain:  Pain Scale 1: Numeric (0 - 10) (11/10/22 0910)  Pain Intensity 1: 0 (11/10/22 0910)   Managed. Neurological Status:   Neuro (WDL): Within Defined Limits (11/10/22 0910)   At baseline. Mental Status and Level of Consciousness: Arousable. Pulmonary Status:   O2 Device: CO2 nasal cannula (11/10/22 0847)   Adequate oxygenation and airway patent. Complications related to anesthesia: None    Post-anesthesia assessment completed. No concerns. Signed By: Ronald Willams DO    11/10/2022  Post anesthesia nausea and vomiting:  controlled      INITIAL Post-op Vital signs:   Vitals Value Taken Time   /80 11/10/22 0921   Temp 36.8 °C (98.2 °F) 11/10/22 0905   Pulse 67 11/10/22 0923   Resp 14 11/10/22 0923   SpO2 97 % 11/10/22 0923   Vitals shown include unvalidated device data.

## (undated) DEVICE — 1200 GUARD II KIT W/5MM TUBE W/O VAC TUBE: Brand: GUARDIAN

## (undated) DEVICE — BASIN EMSIS 16OZ GRAPHITE PLAS KID SHP MOLD GRAD FOR ORAL

## (undated) DEVICE — HYPODERMIC SAFETY NEEDLE: Brand: MAGELLAN

## (undated) DEVICE — Device

## (undated) DEVICE — TRAP,MUCUS SPECIMEN, 80CC: Brand: MEDLINE

## (undated) DEVICE — CONTAINER SPEC 20 ML LID NEUT BUFF FORMALIN 10 % POLYPR STS

## (undated) DEVICE — STRAINER URIN CALC RNL MSH -- CONVERT TO ITEM 357634

## (undated) DEVICE — ELECTRODE,RADIOTRANSLUCENT,FOAM,5PK: Brand: MEDLINE

## (undated) DEVICE — SOLIDIFIER FLD 2OZ 1500CC N DISINF IN BTL DISP SAFESORB

## (undated) DEVICE — NON-REM POLYHESIVE PATIENT RETURN ELECTRODE: Brand: VALLEYLAB

## (undated) DEVICE — NEONATAL-ADULT SPO2 SENSOR: Brand: NELLCOR

## (undated) DEVICE — SNARE ENDOSCP M L240CM W27MM SHTH DIA2.4MM CHN 2.8MM OVL

## (undated) DEVICE — Z DISCONTINUED PER MEDLINE LINE GAS SAMPLING O2/CO2 LNG AD 13 FT NSL W/ TBNG FILTERLINE

## (undated) DEVICE — CATH IV AUTOGRD BC PNK 20GA 25 -- INSYTE

## (undated) DEVICE — TOWEL 4 PLY TISS 19X30 SUE WHT

## (undated) DEVICE — SYR 3ML LL TIP 1/10ML GRAD --

## (undated) DEVICE — SYR 10ML LUER LOK 1/5ML GRAD --

## (undated) DEVICE — SET ADMIN 16ML TBNG L100IN 2 Y INJ SITE IV PIGGY BK DISP (ORDER IN MULIPLES OF 48)